# Patient Record
Sex: FEMALE | Race: OTHER | HISPANIC OR LATINO | Employment: FULL TIME | URBAN - METROPOLITAN AREA
[De-identification: names, ages, dates, MRNs, and addresses within clinical notes are randomized per-mention and may not be internally consistent; named-entity substitution may affect disease eponyms.]

---

## 2019-08-29 ENCOUNTER — OFFICE VISIT (OUTPATIENT)
Dept: OBGYN CLINIC | Facility: CLINIC | Age: 24
End: 2019-08-29

## 2019-08-29 VITALS — HEART RATE: 69 BPM | DIASTOLIC BLOOD PRESSURE: 77 MMHG | WEIGHT: 182.2 LBS | SYSTOLIC BLOOD PRESSURE: 124 MMHG

## 2019-08-29 DIAGNOSIS — N90.89 VULVAR LUMP: ICD-10-CM

## 2019-08-29 DIAGNOSIS — IMO0001 CONTRACEPTION: Primary | ICD-10-CM

## 2019-08-29 PROCEDURE — 99213 OFFICE O/P EST LOW 20 MIN: CPT | Performed by: OBSTETRICS & GYNECOLOGY

## 2019-08-29 RX ORDER — BACITRACIN, NEOMYCIN, POLYMYXIN B 400; 3.5; 5 [USP'U]/G; MG/G; [USP'U]/G
OINTMENT TOPICAL 2 TIMES DAILY
Qty: 15 G | Refills: 0 | Status: SHIPPED | OUTPATIENT
Start: 2019-08-29

## 2019-08-29 RX ORDER — MEDROXYPROGESTERONE ACETATE 150 MG/ML
150 INJECTION, SUSPENSION INTRAMUSCULAR
Qty: 1 ML | Refills: 4 | Status: SHIPPED | OUTPATIENT
Start: 2019-08-29

## 2019-08-29 NOTE — PROGRESS NOTES
ASSESMENT & PLAN:           1  Contraception   - Pt was previously on Depoprovera, refills provided  2  Right vulvar lesion   - Likely 2/2 razor burn   - Right vulvar lesion, not infected, no external manifestation, 1x1cm palpable mass, Rx Neosporin, recommended hot compress, wax pubic hair instead of cutting with razors      SUBJECTIVE:             Patient is a 27 y o  P0 with right vulvar lesion after shaving  She is also here for depoprovera  She is not currently sexually active but would like to resume depoprovera for birth control in general  She declined STD testing  Review of Systems   Review of Systems   Gastrointestinal: Negative for constipation, diarrhea, nausea and vomiting  Genitourinary: Negative for flank pain, menstrual problem, vaginal discharge and vaginal pain  Right vaginal "lump"   Musculoskeletal: Negative for back pain  OB Hx:  OB History   No data available     Medical Hx  No past medical history on file  Surgical Hx  No past surgical history on file  Family Hx  No family history on file    Social Hx  Social History     Socioeconomic History    Marital status: Single     Spouse name: Not on file    Number of children: Not on file    Years of education: Not on file    Highest education level: Not on file   Occupational History    Not on file   Social Needs    Financial resource strain: Not on file    Food insecurity:     Worry: Not on file     Inability: Not on file    Transportation needs:     Medical: Not on file     Non-medical: Not on file   Tobacco Use    Smoking status: Not on file   Substance and Sexual Activity    Alcohol use: Not on file    Drug use: Not on file    Sexual activity: Not on file   Lifestyle    Physical activity:     Days per week: Not on file     Minutes per session: Not on file    Stress: Not on file   Relationships    Social connections:     Talks on phone: Not on file     Gets together: Not on file     Attends Latter day service: Not on file     Active member of club or organization: Not on file     Attends meetings of clubs or organizations: Not on file     Relationship status: Not on file    Intimate partner violence:     Fear of current or ex partner: Not on file     Emotionally abused: Not on file     Physically abused: Not on file     Forced sexual activity: Not on file   Other Topics Concern    Not on file   Social History Narrative    Not on file     Allergies  No Known Allergies  Meds  No current outpatient medications on file  OBJECTIVE:               Vitals  Vitals:    08/29/19 1014   BP: 124/77   Pulse: 69       Physical Exam   Constitutional: She is oriented to person, place, and time  She appears well-developed and well-nourished  No distress  Genitourinary: Vagina normal    Genitourinary Comments: No visible skin lesion on right vulva, palpable 1x1cm round mass superficial right vulvar area at 10 o'clock noted   Neurological: She is alert and oriented to person, place, and time  Skin: She is not diaphoretic  Vitals reviewed        Chad Rodriguez DO  PGY-3 OB/GYN   8/29/2019 10:27 AM

## 2019-10-31 ENCOUNTER — HOSPITAL ENCOUNTER (EMERGENCY)
Facility: HOSPITAL | Age: 24
Discharge: HOME | End: 2019-10-31
Attending: EMERGENCY MEDICINE
Payer: COMMERCIAL

## 2019-10-31 VITALS
RESPIRATION RATE: 20 BRPM | DIASTOLIC BLOOD PRESSURE: 96 MMHG | HEART RATE: 72 BPM | WEIGHT: 184.7 LBS | TEMPERATURE: 96.7 F | SYSTOLIC BLOOD PRESSURE: 113 MMHG | OXYGEN SATURATION: 100 %

## 2019-10-31 DIAGNOSIS — N39.0 UTI (URINARY TRACT INFECTION): ICD-10-CM

## 2019-10-31 DIAGNOSIS — R10.2 PELVIC PAIN: Primary | ICD-10-CM

## 2019-10-31 LAB
BACTERIA UR QL AUTO: ABNORMAL /HPF
BILIRUB UR QL STRIP: NEGATIVE
CLARITY UR: ABNORMAL
COLOR UR: YELLOW
EXT PREG TEST URINE: NORMAL
EXT. CONTROL ED NAV: NEGATIVE
GLUCOSE UR STRIP-MCNC: NEGATIVE MG/DL
HGB UR QL STRIP.AUTO: NEGATIVE
KETONES UR STRIP-MCNC: ABNORMAL MG/DL
LEUKOCYTE ESTERASE UR QL STRIP: NEGATIVE
MUCOUS THREADS UR QL AUTO: ABNORMAL
NITRITE UR QL STRIP: NEGATIVE
NON-SQ EPI CELLS URNS QL MICRO: ABNORMAL /HPF
PH UR STRIP.AUTO: 6.5 [PH]
PROT UR STRIP-MCNC: ABNORMAL MG/DL
RBC #/AREA URNS AUTO: ABNORMAL /HPF
SP GR UR STRIP.AUTO: 1.02 (ref 1–1.04)
UROBILINOGEN UA: 1 MG/DL
WBC #/AREA URNS AUTO: ABNORMAL /HPF

## 2019-10-31 PROCEDURE — 99284 EMERGENCY DEPT VISIT MOD MDM: CPT | Performed by: EMERGENCY MEDICINE

## 2019-10-31 PROCEDURE — 81025 URINE PREGNANCY TEST: CPT | Performed by: EMERGENCY MEDICINE

## 2019-10-31 PROCEDURE — 99283 EMERGENCY DEPT VISIT LOW MDM: CPT

## 2019-10-31 PROCEDURE — 81001 URINALYSIS AUTO W/SCOPE: CPT | Performed by: EMERGENCY MEDICINE

## 2019-10-31 RX ORDER — KETOROLAC TROMETHAMINE 30 MG/ML
15 INJECTION, SOLUTION INTRAMUSCULAR; INTRAVENOUS ONCE
Status: COMPLETED | OUTPATIENT
Start: 2019-10-31 | End: 2019-10-31

## 2019-10-31 RX ORDER — NAPROXEN 500 MG/1
500 TABLET ORAL 2 TIMES DAILY WITH MEALS
Qty: 20 TABLET | Refills: 0 | Status: SHIPPED | OUTPATIENT
Start: 2019-10-31

## 2019-10-31 RX ORDER — SULFAMETHOXAZOLE AND TRIMETHOPRIM 800; 160 MG/1; MG/1
1 TABLET ORAL 2 TIMES DAILY
Qty: 5 TABLET | Refills: 0 | Status: SHIPPED | OUTPATIENT
Start: 2019-10-31 | End: 2019-11-03

## 2019-10-31 RX ORDER — SULFAMETHOXAZOLE AND TRIMETHOPRIM 800; 160 MG/1; MG/1
1 TABLET ORAL ONCE
Status: COMPLETED | OUTPATIENT
Start: 2019-10-31 | End: 2019-10-31

## 2019-10-31 RX ADMIN — KETOROLAC TROMETHAMINE 15 MG: 30 INJECTION, SOLUTION INTRAMUSCULAR; INTRAVENOUS at 02:03

## 2019-10-31 RX ADMIN — SULFAMETHOXAZOLE AND TRIMETHOPRIM 1 TABLET: 800; 160 TABLET ORAL at 02:03

## 2019-10-31 NOTE — ED PROVIDER NOTES
History  Chief Complaint   Patient presents with    Abdominal Pain     pt reports lower abdominal pain x1 hour  denies n/v  states she had pancreatitis 15 years ago, no surgery  26 y/o female c/o crampy lower abdominal / pelvic pain which began one hour PTA  Denies vaginal bleeding, discharge, or dysuria  No fever/chills  Surgical history noncontributory  No meds taken PTA  Pain is constant and nonradiating  Denies nausea and vomiting  Prior to Admission Medications   Prescriptions Last Dose Informant Patient Reported? Taking? medroxyPROGESTERone (DEPO-PROVERA) 150 mg/mL injection   No No   Sig: Inject 1 mL (150 mg total) into a muscle every 3 (three) months   neomycin-bacitracin-polymyxin b (NEOSPORIN) ointment   No No   Sig: Apply topically 2 (two) times a day      Facility-Administered Medications: None       Past Medical History:   Diagnosis Date    Pancreatitis        No past surgical history on file  No family history on file  I have reviewed and agree with the history as documented  Social History     Tobacco Use    Smoking status: Not on file   Substance Use Topics    Alcohol use: Not on file    Drug use: Not on file        Review of Systems   Gastrointestinal: Positive for abdominal pain  Genitourinary: Positive for pelvic pain  All other systems reviewed and are negative  Physical Exam  Physical Exam   Constitutional: She appears well-developed and well-nourished  No distress  HENT:   Head: Normocephalic and atraumatic  Eyes: Pupils are equal, round, and reactive to light  Cardiovascular: Normal rate  Pulmonary/Chest: Effort normal and breath sounds normal    Abdominal: Soft  Normal appearance and bowel sounds are normal  There is no rigidity, no rebound and no guarding  Neurological: She is alert  Skin: Skin is warm  Capillary refill takes less than 2 seconds  Psychiatric: She has a normal mood and affect  Vitals reviewed        Vital Signs  ED Triage Vitals   Temperature Pulse Respirations Blood Pressure SpO2   10/31/19 0022 10/31/19 0022 10/31/19 0022 10/31/19 0023 10/31/19 0022   (!) 96 7 °F (35 9 °C) 72 20 113/96 100 %      Temp Source Heart Rate Source Patient Position - Orthostatic VS BP Location FiO2 (%)   10/31/19 0022 10/31/19 0022 10/31/19 0022 10/31/19 0022 --   Tympanic Monitor Sitting Right arm       Pain Score       --                  Vitals:    10/31/19 0022 10/31/19 0023   BP:  113/96   Pulse: 72    Patient Position - Orthostatic VS: Sitting          Visual Acuity      ED Medications  Medications   ketorolac (TORADOL) injection 15 mg (has no administration in time range)   sulfamethoxazole-trimethoprim (BACTRIM DS) 800-160 mg per tablet 1 tablet (has no administration in time range)       Diagnostic Studies  Results Reviewed     Procedure Component Value Units Date/Time    Urine Microscopic [[id number]]  (Abnormal) Collected:  10/31/19 0121    Lab Status:  Final result Specimen:  Urine, Clean Catch Updated:  10/31/19 0142     RBC, UA None Seen /hpf      WBC, UA 1-2 /hpf      Epithelial Cells Occasional /hpf      Bacteria, UA Occasional /hpf      MUCUS THREADS Moderate    UA w Reflex to Microscopic [[id number]]  (Abnormal) Collected:  10/31/19 0121    Lab Status:  Final result Specimen:  Urine, Clean Catch Updated:  10/31/19 0130     Color, UA Yellow     Clarity, UA Slightly Cloudy     Specific [place], UA 1 020     pH, UA 6 5     Leukocytes, UA Negative     Nitrite, UA Negative     Protein, UA 15 (Trace) mg/dl      Glucose, UA Negative mg/dl      Ketones, UA >=150 (3+) mg/dl      Bilirubin, UA Negative     Blood, UA Negative     UROBILINOGEN UA 1 0 mg/dL     POCT pregnancy, urine [[id number]]  (Normal) Resulted:  10/31/19 0122    Lab Status:  Final result Updated:  10/31/19 0123     EXT PREG TEST UR (Ref: Negative) Neg       Control Negative                 No orders to display              Procedures  Procedures       ED Course MDM    Disposition  Final diagnoses:   Pelvic pain   UTI (urinary tract infection)     Time reflects when diagnosis was documented in both MDM as applicable and the Disposition within this note     Time User Action Codes Description Comment    10/31/2019  1:47 AM Gus Zain Add [R10 2] Pelvic pain     10/31/2019  1:47 AM Gus Pittman Add [N39 0] UTI (urinary tract infection)       ED Disposition     ED Disposition Condition Date/Time Comment    Discharge Stable Thu Oct 31, 2019  1:47 AM Adriana Meza discharge to home/self care  Follow-up Information     Follow up With Specialties Details Why Contact Info Additional Information    [address] [facility] Medicine Go to  As needed [address], [address] [facility] [address]  [facility] [address] [facility], [address], [address], [facility]          Patient's Medications   Discharge Prescriptions    NAPROXEN (NAPROSYN) 500 MG TABLET    Take 1 tablet (500 mg total) by mouth 2 (two) times a day with meals       Start Date: 10/31/2019End Date: --       Order Dose: 500 mg       Quantity: 20 tablet    Refills: 0    SULFAMETHOXAZOLE-TRIMETHOPRIM (BACTRIM DS) 800-160 MG PER TABLET    Take 1 tablet by mouth 2 (two) times a day for 3 days smx-tmp DS (BACTRIM) 800-160 mg tabs (1tab q12 D10)       Start Date: 10/31/2019End Date: 11/3/2019       Order Dose: 1 tablet       Quantity: 5 tablet    Refills: 0     No discharge procedures on file      ED Provider  Electronically Signed by           Jimena Hernadez DO  10/31/19 6530

## 2019-12-26 ENCOUNTER — APPOINTMENT (EMERGENCY)
Dept: CT IMAGING | Facility: HOSPITAL | Age: 24
End: 2019-12-26
Payer: COMMERCIAL

## 2019-12-26 ENCOUNTER — HOSPITAL ENCOUNTER (EMERGENCY)
Facility: HOSPITAL | Age: 24
Discharge: HOME | End: 2019-12-26
Attending: EMERGENCY MEDICINE
Payer: COMMERCIAL

## 2019-12-26 VITALS
WEIGHT: 189.9 LBS | TEMPERATURE: 97.1 F | RESPIRATION RATE: 16 BRPM | DIASTOLIC BLOOD PRESSURE: 63 MMHG | SYSTOLIC BLOOD PRESSURE: 122 MMHG | OXYGEN SATURATION: 100 % | HEART RATE: 71 BPM

## 2019-12-26 DIAGNOSIS — R10.31 RIGHT LOWER QUADRANT ABDOMINAL PAIN: Primary | ICD-10-CM

## 2019-12-26 LAB
ANION GAP SERPL CALCULATED.3IONS-SCNC: 11 MMOL/L (ref 5–14)
BILIRUB UR QL STRIP: NEGATIVE
BUN SERPL-MCNC: 12 MG/DL (ref 5–25)
CALCIUM SERPL-MCNC: 9.2 MG/DL (ref 8.4–10.2)
CHLORIDE SERPL-SCNC: 103 MMOL/L (ref 97–108)
CLARITY UR: CLEAR
CO2 SERPL-SCNC: 24 MMOL/L (ref 22–30)
COLOR UR: NORMAL
CREAT SERPL-MCNC: 0.73 MG/DL (ref 0.6–1.2)
ERYTHROCYTE [DISTWIDTH] IN BLOOD BY AUTOMATED COUNT: 12.7 %
EXT PREG TEST URINE: NORMAL
EXT. CONTROL ED NAV: NEGATIVE
GFR SERPL CREATININE-BSD FRML MDRD: 117 ML/MIN/1.73SQ M
GLUCOSE SERPL-MCNC: 81 MG/DL (ref 70–99)
GLUCOSE UR STRIP-MCNC: NEGATIVE MG/DL
HCT VFR BLD AUTO: 37.8 % (ref 36–46)
HGB BLD-MCNC: 12.4 G/DL (ref 12–16)
HGB UR QL STRIP.AUTO: NEGATIVE
KETONES UR STRIP-MCNC: NEGATIVE MG/DL
LEUKOCYTE ESTERASE UR QL STRIP: NEGATIVE
LYMPHOCYTES # BLD AUTO: 1.8 THOUSAND/UL (ref 0.5–4)
LYMPHOCYTES # BLD AUTO: 11 % (ref 25–45)
MCH RBC QN AUTO: 31 PG (ref 26–34)
MCHC RBC AUTO-ENTMCNC: 32.8 G/DL (ref 31–36)
MCV RBC AUTO: 94 FL (ref 80–100)
MONOCYTES # BLD AUTO: 0.49 THOUSAND/UL (ref 0.2–0.9)
MONOCYTES NFR BLD AUTO: 3 % (ref 1–10)
NEUTS SEG # BLD: 14.1 THOUSAND/UL (ref 1.8–7.8)
NEUTS SEG NFR BLD AUTO: 86 %
NITRITE UR QL STRIP: NEGATIVE
PH UR STRIP.AUTO: 7 [PH]
PLATELET # BLD AUTO: 262 THOUSANDS/UL (ref 150–450)
PLATELET BLD QL SMEAR: ADEQUATE
PMV BLD AUTO: 8 FL (ref 8.9–12.7)
POTASSIUM SERPL-SCNC: 4.1 MMOL/L (ref 3.6–5)
PROT UR STRIP-MCNC: NEGATIVE MG/DL
RBC # BLD AUTO: 4.01 MILLION/UL (ref 4–5.2)
RBC MORPH BLD: NORMAL
SODIUM SERPL-SCNC: 138 MMOL/L (ref 137–147)
SP GR UR STRIP.AUTO: 1.01 (ref 1–1.04)
TOTAL CELLS COUNTED SPEC: 100
UROBILINOGEN UA: NEGATIVE MG/DL
WBC # BLD AUTO: 16.4 THOUSAND/UL (ref 4.5–11)

## 2019-12-26 PROCEDURE — 74177 CT ABD & PELVIS W/CONTRAST: CPT

## 2019-12-26 PROCEDURE — 36415 COLL VENOUS BLD VENIPUNCTURE: CPT | Performed by: PHYSICIAN ASSISTANT

## 2019-12-26 PROCEDURE — 99284 EMERGENCY DEPT VISIT MOD MDM: CPT

## 2019-12-26 PROCEDURE — 81025 URINE PREGNANCY TEST: CPT | Performed by: PHYSICIAN ASSISTANT

## 2019-12-26 PROCEDURE — 96361 HYDRATE IV INFUSION ADD-ON: CPT

## 2019-12-26 PROCEDURE — 85007 BL SMEAR W/DIFF WBC COUNT: CPT | Performed by: PHYSICIAN ASSISTANT

## 2019-12-26 PROCEDURE — 99284 EMERGENCY DEPT VISIT MOD MDM: CPT | Performed by: PHYSICIAN ASSISTANT

## 2019-12-26 PROCEDURE — 80048 BASIC METABOLIC PNL TOTAL CA: CPT | Performed by: PHYSICIAN ASSISTANT

## 2019-12-26 PROCEDURE — 85027 COMPLETE CBC AUTOMATED: CPT | Performed by: PHYSICIAN ASSISTANT

## 2019-12-26 PROCEDURE — 96375 TX/PRO/DX INJ NEW DRUG ADDON: CPT

## 2019-12-26 PROCEDURE — 81003 URINALYSIS AUTO W/O SCOPE: CPT | Performed by: EMERGENCY MEDICINE

## 2019-12-26 PROCEDURE — 96374 THER/PROPH/DIAG INJ IV PUSH: CPT

## 2019-12-26 RX ORDER — ONDANSETRON 2 MG/ML
4 INJECTION INTRAMUSCULAR; INTRAVENOUS ONCE
Status: COMPLETED | OUTPATIENT
Start: 2019-12-26 | End: 2019-12-26

## 2019-12-26 RX ORDER — KETOROLAC TROMETHAMINE 30 MG/ML
15 INJECTION, SOLUTION INTRAMUSCULAR; INTRAVENOUS ONCE
Status: COMPLETED | OUTPATIENT
Start: 2019-12-26 | End: 2019-12-26

## 2019-12-26 RX ADMIN — ONDANSETRON 4 MG: 2 INJECTION, SOLUTION INTRAMUSCULAR; INTRAVENOUS at 21:46

## 2019-12-26 RX ADMIN — IOHEXOL 100 ML: 350 INJECTION, SOLUTION INTRAVENOUS at 22:18

## 2019-12-26 RX ADMIN — SODIUM CHLORIDE 1000 ML: 0.9 INJECTION, SOLUTION INTRAVENOUS at 21:49

## 2019-12-26 RX ADMIN — KETOROLAC TROMETHAMINE 15 MG: 30 INJECTION, SOLUTION INTRAMUSCULAR at 21:45

## 2019-12-27 NOTE — ED PROVIDER NOTES
History  Chief Complaint   Patient presents with    Abdominal Pain     suprapubic pain with burning and pain with urination    Vomiting     x 3    22-year-old female presenting for evaluation of right lower quadrant and suprapubic pain  Patient reports pain starting approximately 2 hours prior to arrival   She denies any dysuria hematuria vaginal discharge or vaginal bleeding  She reports vomiting twice prior to arrival   She reports continued nausea but no diarrhea or constipation  No fevers chills or sweats  This feels unlike anything she has ever felt before  Prior to Admission Medications   Prescriptions Last Dose Informant Patient Reported? Taking? medroxyPROGESTERone (DEPO-PROVERA) 150 mg/mL injection   No No   Sig: Inject 1 mL (150 mg total) into a muscle every 3 (three) months   naproxen (NAPROSYN) 500 mg tablet   No No   Sig: Take 1 tablet (500 mg total) by mouth 2 (two) times a day with meals   neomycin-bacitracin-polymyxin b (NEOSPORIN) ointment   No No   Sig: Apply topically 2 (two) times a day      Facility-Administered Medications: None       Past Medical History:   Diagnosis Date    Pancreatitis        History reviewed  No pertinent surgical history  History reviewed  No pertinent family history  I have reviewed and agree with the history as documented  Social History     Tobacco Use    Smoking status: Never Smoker    Smokeless tobacco: Never Used   Substance Use Topics    Alcohol use: Never     Frequency: Never    Drug use: Never        Review of Systems   All other systems reviewed and are negative  Physical Exam  Physical Exam   Constitutional: She is oriented to person, place, and time  She appears well-developed and well-nourished  Non-toxic appearance  She does not appear ill  No distress  HENT:   Head: Normocephalic and atraumatic  Eyes: Conjunctivae are normal    EOM grossly intact   Neck: Normal range of motion  Neck supple  No JVD present  Cardiovascular: Normal rate  Pulmonary/Chest: Effort normal    Abdominal: Soft  There is tenderness in the right lower quadrant  Musculoskeletal:   FROM, steady gait, cap refill brisk, strength and sensation grossly intact throughout   Neurological: She is alert and oriented to person, place, and time  Skin: Skin is warm and dry  Capillary refill takes less than 2 seconds  Psychiatric: She has a normal mood and affect  Her behavior is normal    Nursing note and vitals reviewed        Vital Signs  ED Triage Vitals [12/26/19 2028]   Temperature Pulse Respirations Blood Pressure SpO2   (!) 97 1 °F (36 2 °C) 71 16 122/63 100 %      Temp Source Heart Rate Source Patient Position - Orthostatic VS BP Location FiO2 (%)   Tympanic Monitor Sitting Left arm --      Pain Score       7           Vitals:    12/26/19 2028   BP: 122/63   Pulse: 71   Patient Position - Orthostatic VS: Sitting         Visual Acuity      ED Medications  Medications   sodium chloride 0 9 % bolus 1,000 mL (0 mL Intravenous Stopped 12/26/19 2348)   ondansetron (ZOFRAN) injection 4 mg (4 mg Intravenous Given 12/26/19 2146)   ketorolac (TORADOL) injection 15 mg (15 mg Intravenous Given 12/26/19 2145)   iohexol (OMNIPAQUE) 350 MG/ML injection (SINGLE-DOSE) 100 mL (100 mL Intravenous Given 12/26/19 2218)       Diagnostic Studies  Results Reviewed     Procedure Component Value Units Date/Time    Basic metabolic panel [[id number]]  (Normal) Collected:  12/26/19 2149    Lab Status:  Final result Specimen:  Blood from Arm, Left Updated:  12/26/19 2204     Sodium 138 mmol/L      Potassium 4 1 mmol/L      Chloride 103 mmol/L      CO2 24 mmol/L      ANION GAP 11 mmol/L      BUN 12 mg/dL      Creatinine 0 73 mg/dL      Glucose 81 mg/dL      Calcium 9 2 mg/dL      eGFR 117 ml/min/1 73sq m     Narrative:       [place] guidelines for Chronic Kidney Disease (CKD):     Stage 1 with normal or high GFR (GFR > 90 mL/min/1 73 square meters)    Stage 2 Mild CKD (GFR = 60-89 mL/min/1 73 square meters)    Stage 3A Moderate CKD (GFR = 45-59 mL/min/1 73 square meters)    Stage 3B Moderate CKD (GFR = 30-44 mL/min/1 73 square meters)    Stage 4 Severe CKD (GFR = 15-29 mL/min/1 73 square meters)    Stage 5 End Stage CKD (GFR <15 mL/min/1 73 square meters)  Note: GFR calculation is accurate only with a steady state creatinine    CBC and differential [[id number]]  (Abnormal) Collected:  12/26/19 2149    Lab Status:  Final result Specimen:  Blood from Arm, Left Updated:  12/26/19 2159     WBC 16 40 Thousand/uL      RBC 4 01 Million/uL      Hemoglobin 12 4 g/dL      Hematocrit 37 8 %      MCV 94 fL      MCH 31 0 pg      MCHC 32 8 g/dL      RDW 12 7 %      MPV 8 0 fL      Platelets 414 Thousands/uL     POCT pregnancy, urine [[id number]]  (Normal) Resulted:  12/26/19 2121    Lab Status:  Final result Updated:  12/26/19 2121     EXT PREG TEST UR (Ref: Negative) valid     Control Negative    UA w Reflex to Microscopic w Reflex to Culture [[id number]]  (Normal) Collected:  12/26/19 2037    Lab Status:  Final result Specimen:  Urine, Clean Catch Updated:  12/26/19 2053     Color, UA Straw     Clarity, UA Clear     Specific Gravity, UA 1 010     pH, UA 7 0     Leukocytes, UA Negative     Nitrite, UA Negative     Protein, UA Negative mg/dl      Glucose, UA Negative mg/dl      Ketones, UA Negative mg/dl      Bilirubin, UA Negative     Blood, UA Negative     UROBILINOGEN UA Negative mg/dL                  CT abdomen pelvis with contrast   Final Result by Noel Davis DO (12/26 2342)      Appendix not visualized, however, no focal pericecal inflammatory changes seen  No other evidence of acute intra-abdominal abnormality           Workstation performed: [id number]                    Procedures  Procedures         ED Course                               MDM  Number of Diagnoses or Management Options  Right lower quadrant abdominal pain:   Diagnosis management comments: 28 yo F presenting for evaluation of RLQ tenderness to palpation, appendix was not visualized on ct, she appears well, repeat abdominal examination is benign, advised to return in 24 hours if pain worsens, she is afebrile, non toxic, no acute distress, f/u with pcp as an outpatient    All labs and imaging discussed with patient, strict return to ED precautions discussed  Pt verbalizes understanding and agrees with plan  Pt is stable for discharge    Portions of the record may have been created with voice recognition software  Occasional wrong word or "sound a like" substitutions may have occurred due to the inherent limitations of voice recognition software  Read the chart carefully and recognize, using context, where substitutions have occurred  Disposition  Final diagnoses:   Right lower quadrant abdominal pain     Time reflects when diagnosis was documented in both MDM as applicable and the Disposition within this note     Time User Action Codes Description Comment    12/26/2019 11:45 PM Adelso Myles Add [R10 31] Right lower quadrant abdominal pain       ED Disposition     ED Disposition Condition Date/Time Comment    Discharge Stable Thu Dec 26, 2019 11:45 PM Alejandra Rogers 84 discharge to home/self care              Follow-up Information     Follow up With Specialties Details Why Contact Info Additional [address] Family Medicine Schedule an appointment as soon as possible for a visit   [address], [address] [facility] [address]  [address], [address], [address] [place], [place], [address] [place] [address]          Discharge Medication List as of 12/26/2019 11:46 PM      CONTINUE these medications which have NOT CHANGED    Details   medroxyPROGESTERone (DEPO-PROVERA) 150 mg/mL injection Inject 1 mL (150 mg total) into a muscle every 3 (three) months, Starting Thu 8/29/2019, Normal      naproxen (NAPROSYN) 500 mg tablet Take 1 tablet (500 mg total) by mouth 2 (two) times a day with meals, Starting Thu 10/31/2019, Print      neomycin-bacitracin-polymyxin b (NEOSPORIN) ointment Apply topically 2 (two) times a day, Starting Thu 8/29/2019, Normal           No discharge procedures on file      ED Provider  Electronically Signed by           Estephania Wyman PA-C  12/30/19 9563

## 2019-12-27 NOTE — ED NOTES
Pt and 2 friends in room giggling and answering questions poorly  When asked the pt what hurts her/what symptoms is she having pt stated "Good question" and friends in room laughing       Emma Postin  12/26/19 2045

## 2019-12-31 NOTE — ED ATTENDING ATTESTATION
I was the attending physician on duty at the time the patient visited the emergency department  The patient was evaluated and dispositioned by the APC  I was personally available for consultation  I am administratively signing the chart after the fact      April Chang MD

## 2023-07-11 ENCOUNTER — ULTRASOUND (OUTPATIENT)
Dept: OBGYN CLINIC | Facility: CLINIC | Age: 28
End: 2023-07-11

## 2023-07-11 VITALS
SYSTOLIC BLOOD PRESSURE: 114 MMHG | HEIGHT: 68 IN | DIASTOLIC BLOOD PRESSURE: 70 MMHG | BODY MASS INDEX: 30.83 KG/M2 | HEART RATE: 90 BPM | WEIGHT: 203.4 LBS

## 2023-07-11 DIAGNOSIS — N92.6 MISSED MENSES: Primary | ICD-10-CM

## 2023-07-11 DIAGNOSIS — Z32.01 POSITIVE PREGNANCY TEST: ICD-10-CM

## 2023-07-11 PROBLEM — IMO0001 CONTRACEPTION: Status: RESOLVED | Noted: 2019-08-29 | Resolved: 2023-07-11

## 2023-07-11 PROBLEM — N90.89 VULVAR LUMP: Status: RESOLVED | Noted: 2019-08-29 | Resolved: 2023-07-11

## 2023-07-11 LAB — SL AMB POCT URINE HCG: POSITIVE

## 2023-07-11 PROCEDURE — 81025 URINE PREGNANCY TEST: CPT | Performed by: NURSE PRACTITIONER

## 2023-07-11 PROCEDURE — 76817 TRANSVAGINAL US OBSTETRIC: CPT | Performed by: NURSE PRACTITIONER

## 2023-07-11 PROCEDURE — 99203 OFFICE O/P NEW LOW 30 MIN: CPT | Performed by: NURSE PRACTITIONER

## 2023-07-11 RX ORDER — VITAMIN A ACETATE, .BETA.-CAROTENE, ASCORBIC ACID, CHOLECALCIFEROL, .ALPHA.-TOCOPHEROL ACETATE, DL-, THIAMINE MONONITRATE, RIBOFLAVIN, NIACINAMIDE, PYRIDOXINE HYDROCHLORIDE, FOLIC ACID, CYANOCOBALAMIN, CALCIUM CARBONATE, FERROUS FUMARATE, ZINC OXIDE, CUPRIC OXIDE 9.9; 120; 920; 200; 400; 2; 12; 27; 1; 20; 10; 3; 1.84; 3080; 25 MG/1; MG/1; [IU]/1; MG/1; [IU]/1; MG/1; UG/1; MG/1; MG/1; MG/1; MG/1; MG/1; MG/1; [IU]/1; MG/1
1 TABLET, FILM COATED ORAL DAILY
Qty: 90 TABLET | Refills: 4 | Status: SHIPPED | OUTPATIENT
Start: 2023-07-11

## 2023-07-11 NOTE — PROGRESS NOTES
Rosie Carter is a  who presents today for viability/dating ultrasound. Patient's last menstrual period was 2023 (exact date). She reports positive pregnancy test at home on 2023. She denies vaginal bleeding or abdominal/pelvic pain. /70   Pulse 90   Ht 5' 8" (1.727 m)   Wt 92.3 kg (203 lb 6.4 oz)   LMP 2023 (Exact Date)   BMI 30.93 kg/m²   Abdomen soft and non-tender. Transvaginal Pelvic Ultrasound:  # of fetuses: 1  Intrauterine: yes  Average CRL: 1.12cm (7w2d)  EDC based on CRL: 2024  Fetal cardiac activity: present  FHR: 139  Additional Findings: + yolk sac    Please choose how you are assigning the CATALINO: The gestational age by LMP is </= 8w 6d and demonstrates 5 or fewer days difference from the gestational age by [facility], therefore the final CATALINO will be based on the LMP. Final CATALINO: 2024 by LMP. Return in 2 weeks for OB intake. Rx prenatal vitamins sent to pharmacy.     Current Outpatient Medications   Medication Instructions   • Prenatal Vit-Fe Fumarate-FA (Prenatal Plus Vitamin/Mineral) 27-1 MG TABS 1 tablet, Oral, Daily       Javad Puga, [address] [place] [address]  [address] [place] [phone]  Dept: [phone]  Dept Fax: [phone]: [phone]  [address] [place] [address]: [phone]  [address] [place] [address] Fax: [phone]

## 2023-07-11 NOTE — PATIENT INSTRUCTIONS
Bernadette por bartholomew confianza en nuestro equipo. Vinay Red y agradecemos debi comentarios. Si recibe rocky encuesta nuestra, tómese unos momentos para informarnos cómo estamos.    Sinceramente,  Porter Jimenez

## 2023-07-12 ENCOUNTER — HOSPITAL ENCOUNTER (EMERGENCY)
Facility: HOSPITAL | Age: 28
Discharge: HOME | End: 2023-07-12
Attending: EMERGENCY MEDICINE
Payer: COMMERCIAL

## 2023-07-12 VITALS
BODY MASS INDEX: 30.77 KG/M2 | OXYGEN SATURATION: 100 % | DIASTOLIC BLOOD PRESSURE: 62 MMHG | TEMPERATURE: 98.5 F | HEART RATE: 67 BPM | SYSTOLIC BLOOD PRESSURE: 132 MMHG | RESPIRATION RATE: 16 BRPM | WEIGHT: 202.34 LBS

## 2023-07-12 DIAGNOSIS — O20.0 THREATENED MISCARRIAGE IN EARLY PREGNANCY: Primary | ICD-10-CM

## 2023-07-12 DIAGNOSIS — O99.891 ASYMPTOMATIC BACTERIURIA DURING PREGNANCY: ICD-10-CM

## 2023-07-12 DIAGNOSIS — R82.71 ASYMPTOMATIC BACTERIURIA DURING PREGNANCY: ICD-10-CM

## 2023-07-12 LAB
ABO GROUP BLD: NORMAL
ALBUMIN SERPL BCP-MCNC: 4.4 G/DL (ref 3.5–5)
ALP SERPL-CCNC: 41 U/L (ref 34–104)
ALT SERPL W P-5'-P-CCNC: 12 U/L (ref 7–52)
ANION GAP SERPL CALCULATED.3IONS-SCNC: 8 MMOL/L
AST SERPL W P-5'-P-CCNC: 20 U/L (ref 13–39)
B-HCG SERPL-ACNC: ABNORMAL MIU/ML (ref 0–5)
BACTERIA UR QL AUTO: ABNORMAL /HPF
BASOPHILS # BLD AUTO: 0.02 THOUSANDS/ÂΜL (ref 0–0.1)
BASOPHILS NFR BLD AUTO: 0 % (ref 0–1)
BILIRUB SERPL-MCNC: 0.63 MG/DL (ref 0.2–1)
BILIRUB UR QL STRIP: NEGATIVE
BUN SERPL-MCNC: 9 MG/DL (ref 5–25)
CALCIUM SERPL-MCNC: 9.6 MG/DL (ref 8.4–10.2)
CHLORIDE SERPL-SCNC: 104 MMOL/L (ref 96–108)
CLARITY UR: CLEAR
CO2 SERPL-SCNC: 26 MMOL/L (ref 21–32)
COLOR UR: YELLOW
CREAT SERPL-MCNC: 0.63 MG/DL (ref 0.6–1.3)
EOSINOPHIL # BLD AUTO: 0.05 THOUSAND/ÂΜL (ref 0–0.61)
EOSINOPHIL NFR BLD AUTO: 1 % (ref 0–6)
ERYTHROCYTE [DISTWIDTH] IN BLOOD BY AUTOMATED COUNT: 12 % (ref 11.6–15.1)
GFR SERPL CREATININE-BSD FRML MDRD: 124 ML/MIN/1.73SQ M
GLUCOSE SERPL-MCNC: 80 MG/DL (ref 65–140)
GLUCOSE UR STRIP-MCNC: NEGATIVE MG/DL
HCT VFR BLD AUTO: 40 % (ref 34.8–46.1)
HGB BLD-MCNC: 12.7 G/DL (ref 11.5–15.4)
HGB UR QL STRIP.AUTO: 250
IMM GRANULOCYTES # BLD AUTO: 0.04 THOUSAND/UL (ref 0–0.2)
IMM GRANULOCYTES NFR BLD AUTO: 0 % (ref 0–2)
KETONES UR STRIP-MCNC: NEGATIVE MG/DL
LEUKOCYTE ESTERASE UR QL STRIP: 25
LYMPHOCYTES # BLD AUTO: 1.81 THOUSANDS/ÂΜL (ref 0.6–4.47)
LYMPHOCYTES NFR BLD AUTO: 17 % (ref 14–44)
MCH RBC QN AUTO: 30.6 PG (ref 26.8–34.3)
MCHC RBC AUTO-ENTMCNC: 31.8 G/DL (ref 31.4–37.4)
MCV RBC AUTO: 96 FL (ref 82–98)
MONOCYTES # BLD AUTO: 0.74 THOUSAND/ÂΜL (ref 0.17–1.22)
MONOCYTES NFR BLD AUTO: 7 % (ref 4–12)
MUCOUS THREADS UR QL AUTO: ABNORMAL
NEUTROPHILS # BLD AUTO: 8.19 THOUSANDS/ÂΜL (ref 1.85–7.62)
NEUTS SEG NFR BLD AUTO: 75 % (ref 43–75)
NITRITE UR QL STRIP: NEGATIVE
NON-SQ EPI CELLS URNS QL MICRO: ABNORMAL /HPF
NRBC BLD AUTO-RTO: 0 /100 WBCS
PH UR STRIP.AUTO: 7 [PH]
PLATELET # BLD AUTO: 243 THOUSANDS/UL (ref 149–390)
PMV BLD AUTO: 9.6 FL (ref 8.9–12.7)
POTASSIUM SERPL-SCNC: 3.5 MMOL/L (ref 3.5–5.3)
PROT SERPL-MCNC: 7.6 G/DL (ref 6.4–8.4)
PROT UR STRIP-MCNC: NEGATIVE MG/DL
RBC # BLD AUTO: 4.15 MILLION/UL (ref 3.81–5.12)
RBC #/AREA URNS AUTO: ABNORMAL /HPF
RH BLD: POSITIVE
SODIUM SERPL-SCNC: 138 MMOL/L (ref 135–147)
SP GR UR STRIP.AUTO: 1.01 (ref 1–1.04)
UROBILINOGEN UA: NEGATIVE MG/DL
WBC # BLD AUTO: 10.85 THOUSAND/UL (ref 4.31–10.16)
WBC #/AREA URNS AUTO: ABNORMAL /HPF

## 2023-07-12 PROCEDURE — 81001 URINALYSIS AUTO W/SCOPE: CPT

## 2023-07-12 PROCEDURE — 87086 URINE CULTURE/COLONY COUNT: CPT

## 2023-07-12 PROCEDURE — 80053 COMPREHEN METABOLIC PANEL: CPT

## 2023-07-12 PROCEDURE — 85025 COMPLETE CBC W/AUTO DIFF WBC: CPT

## 2023-07-12 PROCEDURE — 86900 BLOOD TYPING SEROLOGIC ABO: CPT

## 2023-07-12 PROCEDURE — 81003 URINALYSIS AUTO W/O SCOPE: CPT

## 2023-07-12 PROCEDURE — 99283 EMERGENCY DEPT VISIT LOW MDM: CPT

## 2023-07-12 PROCEDURE — 86901 BLOOD TYPING SEROLOGIC RH(D): CPT

## 2023-07-12 PROCEDURE — 84702 CHORIONIC GONADOTROPIN TEST: CPT

## 2023-07-12 PROCEDURE — 99284 EMERGENCY DEPT VISIT MOD MDM: CPT

## 2023-07-12 PROCEDURE — 36415 COLL VENOUS BLD VENIPUNCTURE: CPT

## 2023-07-12 RX ORDER — CEPHALEXIN 500 MG/1
500 CAPSULE ORAL EVERY 8 HOURS SCHEDULED
Qty: 15 CAPSULE | Refills: 0 | Status: SHIPPED | OUTPATIENT
Start: 2023-07-12 | End: 2023-07-17

## 2023-07-12 NOTE — ED PROVIDER NOTES
History  Chief Complaint   Patient presents with   • Vaginal Bleeding - Pregnant     20 min pta - 6 weeks pregnant, saw obgyn yesterday     34 y.o.  female currently 6 weeks pregnant presents to ED c/o vaginal bleeding x 20 minutes. Per chart review, had initial ultrasound yesterday with confirmed IUP and fetal cardiac activity. History provided by:  Patient and significant other   used: Yes    Vaginal Bleeding  Quality:  Bright red and lighter than menses  Menstrual history:  Missed period  Possible pregnancy: yes    Context: at rest    Associated symptoms: no abdominal pain, no back pain, no dizziness, no dyspareunia, no dysuria, no fatigue, no fever, no nausea and no vaginal discharge        Prior to Admission Medications   Prescriptions Last Dose Informant Patient Reported? Taking? Prenatal Vit-Fe Fumarate-FA (Prenatal Plus Vitamin/Mineral) 27-1 MG TABS   No No   Sig: Take 1 tablet by mouth in the morning      Facility-Administered Medications: None       Past Medical History:   Diagnosis Date   • Pancreatitis        History reviewed. No pertinent surgical history. History reviewed. No pertinent family history. I have reviewed and agree with the history as documented. E-Cigarette/Vaping     E-Cigarette/Vaping Substances     Social History     Tobacco Use   • Smoking status: Never   • Smokeless tobacco: Never   Substance Use Topics   • Alcohol use: Never   • Drug use: Never       Review of Systems   Constitutional: Negative for chills, fatigue and fever. Respiratory: Negative for shortness of breath. Cardiovascular: Negative for palpitations. Gastrointestinal: Negative for abdominal distention, abdominal pain, diarrhea, nausea and vomiting. Genitourinary: Positive for vaginal bleeding. Negative for dyspareunia, dysuria, flank pain, frequency, hematuria, pelvic pain, urgency and vaginal discharge. Musculoskeletal: Negative for back pain.    Skin: Negative for color change. Neurological: Negative for dizziness, syncope and weakness. All other systems reviewed and are negative. Physical Exam  Physical Exam  Vitals and nursing note reviewed. Exam conducted with a chaperone present. Constitutional:       General: She is awake. She is not in acute distress. Appearance: Normal appearance. She is not ill-appearing, toxic-appearing or diaphoretic. HENT:      Head: Normocephalic. Mouth/Throat:      Lips: Pink. Mouth: Mucous membranes are moist.   Eyes:      General: Vision grossly intact. Cardiovascular:      Rate and Rhythm: Normal rate and regular rhythm. Heart sounds: Normal heart sounds. Pulmonary:      Effort: Pulmonary effort is normal. No respiratory distress. Breath sounds: Normal breath sounds. Abdominal:      General: There is no distension. Palpations: Abdomen is soft. Tenderness: There is no abdominal tenderness. There is no right CVA tenderness or left CVA tenderness. Genitourinary:     General: Normal vulva. Vagina: No vaginal discharge or tenderness. Cervix: Cervical bleeding (scant) present. No cervical motion tenderness, discharge, friability or erythema. Skin:     General: Skin is warm and dry. Capillary Refill: Capillary refill takes less than 2 seconds. Neurological:      Mental Status: She is alert.          Vital Signs  ED Triage Vitals   Temperature Pulse Respirations Blood Pressure SpO2   07/12/23 0930 07/12/23 0930 07/12/23 0930 07/12/23 0930 07/12/23 0930   98.5 °F (36.9 °C) 80 18 145/70 100 %      Temp Source Heart Rate Source Patient Position - Orthostatic VS BP Location FiO2 (%)   07/12/23 0930 07/12/23 0930 07/12/23 0930 07/12/23 0930 --   Oral Monitor Sitting Left arm       Pain Score       07/12/23 1135       No Pain           Vitals:    07/12/23 0930 07/12/23 1135   BP: 145/70 132/62   Pulse: 80 67   Patient Position - Orthostatic VS: Sitting Sitting         Visual Acuity      ED Medications  Medications - No data to display    Diagnostic Studies  Results Reviewed     Procedure Component Value Units Date/Time    Urine culture [[id number]] Collected: 07/12/23 1009    Lab Status: Final result Specimen: Urine, Other Updated: 07/13/23 1202     Urine Culture 60,000-69,000 cfu/ml    Urine Microscopic [[id number]]  (Abnormal) Collected: 07/12/23 1009    Lab Status: Final result Specimen: Urine, Other Updated: 07/12/23 1134     RBC, UA 2-4 /hpf      WBC, UA 1-2 /hpf      Epithelial Cells Moderate /hpf      Bacteria, UA Moderate /hpf      MUCUS THREADS Occasional     URINE COMMENT --    hCG, quantitative [[id number]]  (Abnormal) Collected: 07/12/23 1004    Lab Status: Final result Specimen: Blood from Arm, Left Updated: 07/12/23 1121     HCG, Quant 81,703 mIU/mL     Narrative:       Expected Ranges:    HCG results between 5 and 25 mIU/mL may be indicative of early pregnancy but should be interpreted in light of the total clinical presentation. HCG can rise to detectable levels in mya and post menopausal women (0-11.6 mIU/mL).      Approximate               Approximate HCG  Gestation age          Concentration ( mIU/mL)  _____________          ______________________   [facility]                      HCG values  0.2-1                       5-50  1-2                           2-3                         100-5000  3-4                         500-51795  4-5                         1000-92651  5-6                         [id number]  6-8                         [id number]  8-12                        15888-099104      UA w Reflex to Microscopic w Reflex to Culture [[id number]]  (Abnormal) Collected: 07/12/23 1009    Lab Status: Final result Specimen: Urine, Other Updated: 07/12/23 1104     Color, UA Yellow     Clarity, UA Clear     Specific Gravity, UA 1.010     pH, UA 7.0     Leukocytes, UA 25.0     Nitrite, UA Negative     Protein, UA Negative mg/dl      Glucose, UA Negative mg/dl Ketones, UA Negative mg/dl      Bilirubin, UA Negative     Occult Blood, .0     URINE COMMENT --     UROBILINOGEN UA Negative mg/dL     Comprehensive metabolic panel [[id number]] Collected: 07/12/23 1004    Lab Status: Final result Specimen: Blood from Arm, Left Updated: 07/12/23 1048     Sodium 138 mmol/L      Potassium 3.5 mmol/L      Chloride 104 mmol/L      CO2 26 mmol/L      ANION GAP 8 mmol/L      BUN 9 mg/dL      Creatinine 0.63 mg/dL      Glucose 80 mg/dL      Calcium 9.6 mg/dL      AST 20 U/L      ALT 12 U/L      Alkaline Phosphatase 41 U/L      Total Protein 7.6 g/dL      Albumin 4.4 g/dL      Total Bilirubin 0.63 mg/dL      eGFR 124 ml/min/1.73sq m     Narrative:      National Kidney Disease Foundation guidelines for Chronic Kidney Disease (CKD):   •  Stage 1 with normal or high GFR (GFR > 90 mL/min/1.73 square meters)  •  Stage 2 Mild CKD (GFR = 60-89 mL/min/1.73 square meters)  •  Stage 3A Moderate CKD (GFR = 45-59 mL/min/1.73 square meters)  •  Stage 3B Moderate CKD (GFR = 30-44 mL/min/1.73 square meters)  •  Stage 4 Severe CKD (GFR = 15-29 mL/min/1.73 square meters)  •  Stage 5 End Stage CKD (GFR <15 mL/min/1.73 square meters)  Note: GFR calculation is accurate only with a steady state creatinine    CBC and differential [[id number]]  (Abnormal) Collected: 07/12/23 1004    Lab Status: Final result Specimen: Blood from Arm, Left Updated: 07/12/23 1030     WBC 10.85 Thousand/uL      RBC 4.15 Million/uL      Hemoglobin 12.7 g/dL      Hematocrit 40.0 %      MCV 96 fL      MCH 30.6 pg      MCHC 31.8 g/dL      RDW 12.0 %      MPV 9.6 fL      Platelets 831 Thousands/uL      nRBC 0 /100 WBCs      Neutrophils Relative 75 %      Immat GRANS % 0 %      Lymphocytes Relative 17 %      Monocytes Relative 7 %      Eosinophils Relative 1 %      Basophils Relative 0 %      Neutrophils Absolute 8.19 Thousands/µL      Immature Grans Absolute 0.04 Thousand/uL      Lymphocytes Absolute 1.81 Thousands/µL      Monocytes Absolute 0.74 Thousand/µL      Eosinophils Absolute 0.05 Thousand/µL      Basophils Absolute 0.02 Thousands/µL                  No orders to display              Procedures  Procedures         ED Course  ED Course as of 23 1815      1017 ED [facility] chaperoned. Closed cervical os with scant bleeding. 1033  bpm via transabdominal ultrasound performed by Dr. Margo Bell with my assistance    1125 Rh Factor: Positive  No rhogam required. SBIRT 22yo+    Flowsheet Row Most Recent Value   Initial Alcohol Screen: US AUDIT-C     1. How often do you have a drink containing alcohol? 0 Filed at: 2023 0934   2. How many drinks containing alcohol do you have on a typical day you are drinking? 0 Filed at: 202334   3a. Male UNDER 65: How often do you have five or more drinks on one occasion? 0 Filed at: 2023 0934   3b. FEMALE Any Age, or MALE 65+: How often do you have 4 or more drinks on one occassion? 0 Filed at: 2023   Audit-C Score 0 Filed at: 2023 8647   JON: How many times in the past year have you. .. Used an illegal drug or used a prescription medication for non-medical reasons? Never Filed at: 2023 9070                    Medical Decision Making  Vaginal bleeding without pain in confirmed IUP 6 weeks. Differential includes but is not limited to , subchorionic hematoma, first trimester , heterotopic pregnancy, fibroids. FHT WNL. No abdominal tenderness or distention. Scant bleeding, closed os on exam. Normal hgb. No leukocytosis. UA with bacteruria will treat as patient is pregnant. Rh negative, no rhogam necessary. Patient informed of possibility of threatened miscarriage importance of follow up, voiced understanding. All imaging and/or lab testing discussed with patient, strict return to ED precautions discussed. Patient recommended to follow up promptly with appropriate outpatient provider.  Patient and/or family members verbalizes understanding and agrees with plan. Patient and/or family members were given opportunity to ask questions, all questions were answered at this time. Patient is stable for discharge.     Portions of the record may have been created with voice recognition software. Occasional wrong word or "sound a like" substitutions may have occurred due to the inherent limitations of voice recognition software. Read the chart carefully and recognize, using context, where substitutions have occurred. Amount and/or Complexity of Data Reviewed  Labs: ordered. Decision-making details documented in ED Course. Risk  Prescription drug management. Disposition  Final diagnoses:   Threatened miscarriage in early pregnancy   Asymptomatic bacteriuria during pregnancy     Time reflects when diagnosis was documented in both MDM as applicable and the Disposition within this note     Time User Action Codes Description Comment    7/12/2023 10:40 AM Carmel Cinnamon Add [O20.0] Threatened miscarriage in early pregnancy     7/12/2023 11:40 AM Carmel Cinnamon Add [O99.891,  R82.71] Asymptomatic bacteriuria during pregnancy       ED Disposition     ED Disposition   Discharge    Condition   Stable    Date/Time   Wed Jul 12, 2023 11:42 AM    Comment   Adriana Meza discharge to home/self care.                Follow-up Information     Follow up With Specialties Details Why Contact Info Additional [address] Ob[facility]etric[facility] Gy[facility] Schedule an appointment as soon as possible for a visit  For follow up regarding your symptoms [address] [facility] [address] [place] [id number]  [address] [place] [address], [address] [place] [address], [id number] [phone]          Discharge Medication List as of 7/12/2023 11:44 AM      START taking these medications    Details   cephalexin (KEFLEX) 500 mg capsule Take 1 capsule (500 mg total) by mouth every 8 (eight) hours for 5 days, Starting Wed 7/12/2023, Until Mon 7/17/2023, Normal         CONTINUE these medications which have NOT CHANGED    Details   Prenatal Vit-Fe Fumarate-FA (Prenatal Plus Vitamin/Mineral) 27-1 MG TABS Take 1 tablet by mouth in the morning, Starting Tue 7/11/2023, Normal             Outpatient Discharge Orders   hCG, quantitative   Standing Status: Future Standing Exp.  Date: 07/12/24       PDMP Review     None          ED Provider  Electronically Signed by           Marylene Seal, PA-C  07/14/23 8498

## 2023-07-12 NOTE — DISCHARGE INSTRUCTIONS
Get repeat blood work done in 2 days, take the prescription that was given to you to the  at the main entrance of the hospital and they will direct you. Take antibiotic as prescribed. Follow-up with your OB/GYN. Return to ED for new or worsening symptoms as discussed.

## 2023-07-13 LAB — BACTERIA UR CULT: NORMAL

## 2023-07-26 ENCOUNTER — INITIAL PRENATAL (OUTPATIENT)
Dept: OBGYN CLINIC | Facility: CLINIC | Age: 28
End: 2023-07-26

## 2023-07-26 ENCOUNTER — APPOINTMENT (OUTPATIENT)
Dept: LAB | Facility: HOSPITAL | Age: 28
End: 2023-07-26
Payer: COMMERCIAL

## 2023-07-26 VITALS
BODY MASS INDEX: 30.8 KG/M2 | HEIGHT: 68 IN | DIASTOLIC BLOOD PRESSURE: 77 MMHG | HEART RATE: 83 BPM | WEIGHT: 203.2 LBS | SYSTOLIC BLOOD PRESSURE: 127 MMHG

## 2023-07-26 DIAGNOSIS — Z59.9 FINANCIAL DIFFICULTIES: ICD-10-CM

## 2023-07-26 DIAGNOSIS — Z31.430 ENCOUNTER OF FEMALE FOR TESTING FOR GENETIC DISEASE CARRIER STATUS FOR PROCREATIVE MANAGEMENT: ICD-10-CM

## 2023-07-26 DIAGNOSIS — Z34.91 PRENATAL CARE IN FIRST TRIMESTER: ICD-10-CM

## 2023-07-26 DIAGNOSIS — Z59.41 FOOD INSECURITY: ICD-10-CM

## 2023-07-26 DIAGNOSIS — Z3A.08 8 WEEKS GESTATION OF PREGNANCY: ICD-10-CM

## 2023-07-26 DIAGNOSIS — Z34.91 PRENATAL CARE IN FIRST TRIMESTER: Primary | ICD-10-CM

## 2023-07-26 LAB
ABO GROUP BLD: NORMAL
BACTERIA UR QL AUTO: ABNORMAL /HPF
BASOPHILS # BLD AUTO: 0.03 THOUSANDS/ÂΜL (ref 0–0.1)
BASOPHILS NFR BLD AUTO: 0 % (ref 0–1)
BILIRUB UR QL STRIP: NEGATIVE
BLD GP AB SCN SERPL QL: NEGATIVE
CAOX CRY URNS QL MICRO: ABNORMAL /HPF
CLARITY UR: CLEAR
COLOR UR: ABNORMAL
EOSINOPHIL # BLD AUTO: 0.03 THOUSAND/ÂΜL (ref 0–0.61)
EOSINOPHIL NFR BLD AUTO: 0 % (ref 0–6)
ERYTHROCYTE [DISTWIDTH] IN BLOOD BY AUTOMATED COUNT: 12.3 % (ref 11.6–15.1)
GLUCOSE 1H P 50 G GLC PO SERPL-MCNC: 101 MG/DL (ref 40–134)
GLUCOSE UR STRIP-MCNC: NEGATIVE MG/DL
HBV SURFACE AB SER-ACNC: 81 MIU/ML
HBV SURFACE AG SER QL: NORMAL
HCT VFR BLD AUTO: 37.5 % (ref 34.8–46.1)
HCV AB SER QL: NORMAL
HGB BLD-MCNC: 12 G/DL (ref 11.5–15.4)
HGB UR QL STRIP.AUTO: NEGATIVE
HIV 1+2 AB+HIV1 P24 AG SERPL QL IA: NORMAL
HIV 2 AB SERPL QL IA: NORMAL
HIV1 AB SERPL QL IA: NORMAL
HIV1 P24 AG SERPL QL IA: NORMAL
IMM GRANULOCYTES # BLD AUTO: 0.05 THOUSAND/UL (ref 0–0.2)
IMM GRANULOCYTES NFR BLD AUTO: 0 % (ref 0–2)
KETONES UR STRIP-MCNC: NEGATIVE MG/DL
LEUKOCYTE ESTERASE UR QL STRIP: 100
LYMPHOCYTES # BLD AUTO: 2.15 THOUSANDS/ÂΜL (ref 0.6–4.47)
LYMPHOCYTES NFR BLD AUTO: 15 % (ref 14–44)
MCH RBC QN AUTO: 30.8 PG (ref 26.8–34.3)
MCHC RBC AUTO-ENTMCNC: 32 G/DL (ref 31.4–37.4)
MCV RBC AUTO: 96 FL (ref 82–98)
MONOCYTES # BLD AUTO: 0.76 THOUSAND/ÂΜL (ref 0.17–1.22)
MONOCYTES NFR BLD AUTO: 5 % (ref 4–12)
MUCOUS THREADS UR QL AUTO: ABNORMAL
NEUTROPHILS # BLD AUTO: 11.66 THOUSANDS/ÂΜL (ref 1.85–7.62)
NEUTS SEG NFR BLD AUTO: 80 % (ref 43–75)
NITRITE UR QL STRIP: NEGATIVE
NON-SQ EPI CELLS URNS QL MICRO: ABNORMAL /HPF
NRBC BLD AUTO-RTO: 0 /100 WBCS
PH UR STRIP.AUTO: 6 [PH]
PLATELET # BLD AUTO: 258 THOUSANDS/UL (ref 149–390)
PMV BLD AUTO: 9.5 FL (ref 8.9–12.7)
PROT UR STRIP-MCNC: NEGATIVE MG/DL
RBC # BLD AUTO: 3.89 MILLION/UL (ref 3.81–5.12)
RBC #/AREA URNS AUTO: ABNORMAL /HPF
RH BLD: POSITIVE
RUBV IGG SERPL IA-ACNC: 45.5 IU/ML
SP GR UR STRIP.AUTO: 1.02 (ref 1–1.04)
SPECIMEN EXPIRATION DATE: NORMAL
TREPONEMA PALLIDUM IGG+IGM AB [PRESENCE] IN SERUM OR PLASMA BY IMMUNOASSAY: NORMAL
UROBILINOGEN UA: NEGATIVE MG/DL
WBC # BLD AUTO: 14.68 THOUSAND/UL (ref 4.31–10.16)
WBC #/AREA URNS AUTO: ABNORMAL /HPF

## 2023-07-26 PROCEDURE — 86762 RUBELLA ANTIBODY: CPT

## 2023-07-26 PROCEDURE — 85025 COMPLETE CBC W/AUTO DIFF WBC: CPT

## 2023-07-26 PROCEDURE — 81001 URINALYSIS AUTO W/SCOPE: CPT

## 2023-07-26 PROCEDURE — 86901 BLOOD TYPING SEROLOGIC RH(D): CPT

## 2023-07-26 PROCEDURE — 86850 RBC ANTIBODY SCREEN: CPT

## 2023-07-26 PROCEDURE — 86706 HEP B SURFACE ANTIBODY: CPT

## 2023-07-26 PROCEDURE — 87389 HIV-1 AG W/HIV-1&-2 AB AG IA: CPT

## 2023-07-26 PROCEDURE — OBC

## 2023-07-26 PROCEDURE — 87086 URINE CULTURE/COLONY COUNT: CPT

## 2023-07-26 PROCEDURE — 86803 HEPATITIS C AB TEST: CPT

## 2023-07-26 PROCEDURE — 86780 TREPONEMA PALLIDUM: CPT

## 2023-07-26 PROCEDURE — 82950 GLUCOSE TEST: CPT

## 2023-07-26 PROCEDURE — 87340 HEPATITIS B SURFACE AG IA: CPT

## 2023-07-26 PROCEDURE — 86900 BLOOD TYPING SEROLOGIC ABO: CPT

## 2023-07-26 PROCEDURE — 36415 COLL VENOUS BLD VENIPUNCTURE: CPT

## 2023-07-26 PROCEDURE — 83020 HEMOGLOBIN ELECTROPHORESIS: CPT

## 2023-07-26 SDOH — ECONOMIC STABILITY - FOOD INSECURITY: FOOD INSECURITY: Z59.41

## 2023-07-26 SDOH — ECONOMIC STABILITY - INCOME SECURITY: PROBLEM RELATED TO HOUSING AND ECONOMIC CIRCUMSTANCES, UNSPECIFIED: Z59.9

## 2023-07-26 NOTE — PROGRESS NOTES
OBSTETRIC INTAKE VISIT    Rene Iqbal presents today for initial OB visit and intake at [address] [place] [address]. LMP - 23. History obtained from patient and she reports it as follows:    Past Medical History:   Diagnosis Date   • Anxiety    • Pancreatitis      History reviewed. No pertinent surgical history. OB History    Para Term  AB Living   2 0 0 0 1 0   SAB IAB Ectopic Multiple Live Births   1 0 0 0 0      # Outcome Date GA Lbr Javier/2nd Weight Sex Delivery Anes PTL Lv   2 Current            1 SAB  8w0d            Social History     Tobacco Use   • Smoking status: Never   • Smokeless tobacco: Never   Vaping Use   • Vaping Use: Never used   Substance Use Topics   • Alcohol use: Never   • Drug use: Never       Current Outpatient Medications   Medication Instructions   • Prenatal Vit-Fe Fumarate-FA (Prenatal Plus Vitamin/Mineral) 27-1 MG TABS 1 tablet, Oral, Daily       Allergies   Allergen Reactions   • Shellfish-Derived Products - Food Allergy Dizziness     Nausea, throat closes       Vitals: /77 (BP Location: Left arm, Patient Position: Sitting, Cuff Size: Standard)   Pulse 83   Ht 5' 8" (1.727 m)   Wt 92.2 kg (203 lb 3.2 oz)   LMP 2023 (Exact Date)   BMI 30.90 kg/m²     Review of Systems:  Denies vaginal bleeding or leaking fluid. Denies abdominal/pelvic pain or contractions. Plan:  1. OB labwork ordered today to include Prenatal Panel, HCV antibody, Hgb fractionation cascade, Prenatal Carrier Screen Panel. Other labs include Glucose 1H PG. Advised patient to have drawn within the next few days. 2. Will help pt schedule ultrasound with MFM. 3. Return 23 for H&P prenatal visit. 4. Referrals placed for [facility], , and Nurse [facility] [place]. 5. Given vaccines: None due. 6. Patient's depression screening was assessed with a PHQ-2 score of 0. Their PHQ-9 score was 1. Clinically patient does not have depression. No treatment is required.    will call patient.  Reviewed the following educational topics with patient:   -routine prenatal visit/ultrasound/labwork schedule   -hospital for delivery and office phone/answering service contact information   -nutritional demands of pregnancy, healthy dietary habits   -listeria, toxoplasmosis, seafood precautions   -weight gain expectations (based on pre-pregnant BMI)   -exercise, rest, and sexual activity during pregnancy   -abstinence from alcohol, tobacco, and illegal drugs   -common discomforts of pregnancy and appropriate management   -OTC medications safe to use in pregnancy   -genetic screening options   -vaccines in pregnancy   -symptoms to report to OB provider    -signs of PTL and pre-eclampsia    -vaginal bleeding/leaking of fluid    -severe n/v-unable to tolerate ANY food/fluids for more than 24 hours

## 2023-07-26 NOTE — LETTER
7/26/2023      This letter is to confirm that Traci John is pregnant and is under our care. Her Estimated Date of Delivery: 2/29/24. If you have any questions or concerns, please contact our office.   Thank you,    DANIELLE Hernandez

## 2023-07-26 NOTE — LETTER
[address] [place] [address] REFERRAL      Date: 7/26/2023    Patient name: Jack Driscoll    YOB: 1995    Estimated Date of Delivery: 2/29/24      /77 (BP Location: Left arm, Patient Position: Sitting, Cuff Size: Standard)   Pulse 83   Ht 5' 8" (1.727 m)   Wt 92.2 kg (203 lb 3.2 oz)   LMP 05/25/2023 (Exact Date)   BMI 30.90 kg/m²         Thank you,  Miroslava Bueno, [address] [place] [address] [address] [place] [address] locations:   1. [facility]       [address] [facility]       [facility], [address] [facility]       Phone: [phone]       Fax: [phone]     2.  [address] [facility]       [address] [facility], [address] [facility]       Phone: [phone]       Fax: [phone]

## 2023-07-26 NOTE — PATIENT INSTRUCTIONS
SENALES SUSHIL EL EMBARAZO  Llame a nuestra oficina al [phone] para cualquiera de los siguientes:    1. sangrado vaginal  2. Dolor abdominal hawa que no desaparece. 3. Fiebre (más de 100.4 y no se kirill con Tylenol)  4. Vómitos persistentes que pacheco más de 24 horas. 5. dolor de pecho  6. Dolor o ardor al orinar. 7. Dolor de vanessa severo que no se resuelve con Tylenol  8. Visión borrosa o haven puntos en bartholomew visión. 9. Hinchazón repentina de bartholomew maria luz o salina. 10. Enrojecimiento, hinchazón o dolor en rocky pierna. 11. Un aumento de peso repentino en pocos días. 12. Contar los movimientos fetales del bebé. (después de 28 semanas o el sexto mes de embarazo)  15. Rocky pérdida de líquido acuoso de la vagina: puede ser un chorro, un goteo o rocky humedad continua  14.  Después de 20 semanas de embarazo, calambres rítmicos (más de 4 por hora) o menstruales darío dolor Margarette Rodriguez / Bhargav Vasques

## 2023-07-28 ENCOUNTER — TELEPHONE (OUTPATIENT)
Dept: PERINATAL CARE | Facility: OTHER | Age: 28
End: 2023-07-28

## 2023-07-28 LAB — BACTERIA UR CULT: NORMAL

## 2023-07-28 NOTE — TELEPHONE ENCOUNTER
Spoke with patient and confirmed her MFM appointment had to be rescheduled. Patient verbalized understanding of new time, date and location of appointment.

## 2023-07-31 LAB
HGB A MFR BLD: 2.6 % (ref 1.8–3.2)
HGB A MFR BLD: 97.4 % (ref 96.4–98.8)
HGB F MFR BLD: 0 % (ref 0–2)
HGB FRACT BLD-IMP: NORMAL
HGB S MFR BLD: 0 %

## 2023-08-01 ENCOUNTER — APPOINTMENT (OUTPATIENT)
Dept: LAB | Facility: HOSPITAL | Age: 28
End: 2023-08-01
Attending: NURSE PRACTITIONER
Payer: COMMERCIAL

## 2023-08-01 DIAGNOSIS — Z31.430 ENCOUNTER OF FEMALE FOR TESTING FOR GENETIC DISEASE CARRIER STATUS FOR PROCREATIVE MANAGEMENT: ICD-10-CM

## 2023-08-01 DIAGNOSIS — Z3A.08 8 WEEKS GESTATION OF PREGNANCY: ICD-10-CM

## 2023-08-01 DIAGNOSIS — Z34.91 PRENATAL CARE IN FIRST TRIMESTER: ICD-10-CM

## 2023-08-01 PROCEDURE — 36415 COLL VENOUS BLD VENIPUNCTURE: CPT

## 2023-08-01 PROCEDURE — 81220 CFTR GENE COM VARIANTS: CPT

## 2023-08-01 PROCEDURE — 81329 SMN1 GENE DOS/DELETION ALYS: CPT

## 2023-08-07 LAB
CITATION REF LAB TEST: NORMAL
CLINICAL INFO: NORMAL
ETHNIC BACKGROUND STATED: NORMAL
GENE DIS ANL CARRIER INTERP-IMP: NORMAL
GENE MUT TESTED BLD/T: NORMAL
LAB DIRECTOR NAME PROVIDER: NORMAL
REASON FOR REFERRAL (NARRATIVE): NORMAL
RECOMMENDATION PATIENT DOC-IMP: NORMAL
REF LAB TEST METHOD: NORMAL
SERVICE CMNT-IMP: NORMAL
SMN1 GENE MUT ANL BLD/T: NORMAL
SPECIMEN SOURCE: NORMAL

## 2023-08-08 LAB
CF COMMENT: NORMAL
CFTR MUT ANL BLD/T: NORMAL

## 2023-08-09 ENCOUNTER — INITIAL PRENATAL (OUTPATIENT)
Dept: OBGYN CLINIC | Facility: CLINIC | Age: 28
End: 2023-08-09

## 2023-08-09 VITALS
WEIGHT: 204 LBS | HEART RATE: 88 BPM | DIASTOLIC BLOOD PRESSURE: 75 MMHG | HEIGHT: 68 IN | SYSTOLIC BLOOD PRESSURE: 126 MMHG | BODY MASS INDEX: 30.92 KG/M2

## 2023-08-09 DIAGNOSIS — Z34.91 PRENATAL CARE IN FIRST TRIMESTER: Primary | ICD-10-CM

## 2023-08-09 DIAGNOSIS — B37.31 VAGINAL CANDIDIASIS: ICD-10-CM

## 2023-08-09 DIAGNOSIS — Z3A.10 10 WEEKS GESTATION OF PREGNANCY: ICD-10-CM

## 2023-08-09 PROBLEM — O99.210 OBESITY AFFECTING PREGNANCY: Status: ACTIVE | Noted: 2023-08-09

## 2023-08-09 PROCEDURE — 87591 N.GONORRHOEAE DNA AMP PROB: CPT | Performed by: NURSE PRACTITIONER

## 2023-08-09 PROCEDURE — PNV: Performed by: NURSE PRACTITIONER

## 2023-08-09 PROCEDURE — 87491 CHLMYD TRACH DNA AMP PROBE: CPT | Performed by: NURSE PRACTITIONER

## 2023-08-09 PROCEDURE — G0145 SCR C/V CYTO,THINLAYER,RESCR: HCPCS | Performed by: NURSE PRACTITIONER

## 2023-08-09 RX ORDER — FLUCONAZOLE 150 MG/1
150 TABLET ORAL ONCE
Qty: 1 TABLET | Refills: 0 | Status: SHIPPED | OUTPATIENT
Start: 2023-08-09 | End: 2023-08-09

## 2023-08-09 NOTE — LETTER
8/15/2023    To Rosie Carter  : 1995      Esta carta es para informarle que debi resultados recientes de la prueba de Papanicolaou fueron revisados por mí y son Suzzanna Sos.   Nos vemos en 1 año para bartholomew [address] [facility]

## 2023-08-09 NOTE — PROGRESS NOTES
Norris Grider presents today for H&P OB visit at [address] [place] [address]. KQ=522/75  Wt=92.5 kg (204 lb); Body mass index is 31.02 kg/m².; TWG=0.454 kg (1 lb)  Fetal Heart Rate: 162  Abdomen: soft, non-tender. She reports vaginal discharge and itching. On exam vaginal candidiasis is noted. Given Rx Diflucan. Denies vaginal bleeding or leaking of fluid. Pap/GC/CT collected today. Scheduled for ultrasound 23. Reviewed common discomforts of pregnancy in first trimester and warning signs. Advised to continue medications and return in 4 weeks. Current Outpatient Medications   Medication Instructions   • fluconazole (DIFLUCAN) 150 mg, Oral, Once   • Prenatal Vit-Fe Fumarate-FA (Prenatal Plus Vitamin/Mineral) 27-1 MG TABS 1 tablet, Oral, Daily       Laboratory workup: initial OB labs (done 23)    Genetic Screening: scheduled w/MFM for 23    Vaccinations: influenza (will offer during flu season); Tdap (will offer after 27 weeks);  COVID-19 (rec'd x3 doses - will bring documentation)    Postpartum contraception: considering Mirena IUD    Fetal Ultrasounds:  23 (7w2d) EDC confirmed      G2 Problems (from 23 to present)     Problem Noted Resolved    Obesity affecting pregnancy 2023 by DANIELLE Faith No    Overview Signed 2023  9:44 AM by DANIELLE Faith     Pre-pregnant BMI=30.87  Needs early GDM screen - done WNL  Serial growth scans               Past Medical History:   Diagnosis Date   • Anxiety    • Pancreatitis      Past Surgical History:   Procedure Laterality Date   • NO PAST SURGERIES       OB History        2    Para   0    Term   0       0    AB   1    Living   0       SAB   1    IAB   0    Ectopic   0    Multiple   0    Live Births   0               Social History     Tobacco Use   • Smoking status: Never   • Smokeless tobacco: Never   Vaping Use   • Vaping Use: Never used   Substance Use Topics   • Alcohol use: Never   • Drug use: Never

## 2023-08-09 NOTE — PATIENT INSTRUCTIONS
Bernadette por bartholomew confianza en nuestro equipo. Wilhemenia Filbert y agradecemos debi comentarios. Si recibe beryl encuesta nuestra, tómese unos momentos para informarnos cómo estamos. DANIELLE Acosta       [facility] [place]  Llame a Rana Heads al [phone] para cualquiera de los siguientes:    1. sangrado vaginal  2. Dolor abdominal hawa que no desaparece. 3. Fiebre (más de 100.4 y no se kirill con Tylenol)  4. Vómitos persistentes que pacheco más de 24 horas. 5. dolor de pecho  6. Dolor o ardor al orinar. 7. Dolor de vanessa severo que no se resuelve con Tylenol  8. Visión borrosa o haven puntos en bartholomew visión. 9. Hinchazón repentina de bartholomew maria luz o salina. 10. Enrojecimiento, hinchazón o dolor en beryl pierna. 11. Un aumento de peso repentino en pocos días. 12. Contar los movimientos fetales del bebé. (después de 28 semanas o el sexto mes de embarazo)  15. Beryl pérdida de líquido acuoso de la vagina: puede ser un chorro, un goteo o beryl humedad continua  14.  Después de 20 semanas de embarazo, calambres rítmicos (más de 4 por hora) o menstruales darío dolor Huerfano Grosser / Aisha Montane

## 2023-08-11 LAB
C TRACH DNA SPEC QL NAA+PROBE: NEGATIVE
N GONORRHOEA DNA SPEC QL NAA+PROBE: NEGATIVE

## 2023-08-15 LAB
LAB AP GYN PRIMARY INTERPRETATION: NORMAL
Lab: NORMAL

## 2023-08-21 ENCOUNTER — PATIENT OUTREACH (OUTPATIENT)
Dept: OBGYN CLINIC | Facility: CLINIC | Age: 28
End: 2023-08-21

## 2023-08-21 NOTE — PROGRESS NOTES
[facility] received a new referral in regard to new pre jermaine pt. [facility] contacted pt using [employer] services,  # [id number]. [facility] introduced self and role. Pt agreed to complete prenatal psychosocial assessment via phone. Per pt she has had one previous pregnancies prior to this current one that ended in a miscarriage. Pt was very happy to learn about the pregnancy and per pt her family was also happy about the news. Per pt there is distance between herself and FOB. Per pt she has no mental health diagnosis or concerns. Pt has felt very content during this pregnancy. Pt lives by herself and reports no concerns about her home. 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 Pt receives MA and WIC. Pt gets to and from her appts with a car. Pt shares that her mother is her support person and will be helping pt when she goes home after the delivery. Pt reports no use of cigarettes, alcohol, or drugs. No concerns noted. [facility] will send a resource packet to pt via mail. Pt was advised to contact [facility] with any future concerns. [facility] will remain available as needed.

## 2023-08-22 ENCOUNTER — PATIENT OUTREACH (OUTPATIENT)
Dept: OBGYN CLINIC | Facility: CLINIC | Age: 28
End: 2023-08-22

## 2023-08-30 ENCOUNTER — ROUTINE PRENATAL (OUTPATIENT)
Age: 28
End: 2023-08-30
Payer: COMMERCIAL

## 2023-08-30 VITALS
DIASTOLIC BLOOD PRESSURE: 56 MMHG | BODY MASS INDEX: 31.64 KG/M2 | SYSTOLIC BLOOD PRESSURE: 122 MMHG | HEIGHT: 68 IN | HEART RATE: 102 BPM | WEIGHT: 208.8 LBS

## 2023-08-30 DIAGNOSIS — Z36.9 UNSPECIFIED ANTENATAL SCREENING: ICD-10-CM

## 2023-08-30 DIAGNOSIS — Z36.82 ENCOUNTER FOR (NT) NUCHAL TRANSLUCENCY SCAN: Primary | ICD-10-CM

## 2023-08-30 DIAGNOSIS — O99.211 OBESITY AFFECTING PREGNANCY IN FIRST TRIMESTER: ICD-10-CM

## 2023-08-30 DIAGNOSIS — O34.10 UTERINE FIBROID IN PREGNANCY: ICD-10-CM

## 2023-08-30 DIAGNOSIS — D25.9 UTERINE FIBROID IN PREGNANCY: ICD-10-CM

## 2023-08-30 DIAGNOSIS — Z34.91 PRENATAL CARE IN FIRST TRIMESTER: ICD-10-CM

## 2023-08-30 DIAGNOSIS — Z33.1 PREGNANT STATE, INCIDENTAL: ICD-10-CM

## 2023-08-30 DIAGNOSIS — Z3A.13 13 WEEKS GESTATION OF PREGNANCY: ICD-10-CM

## 2023-08-30 PROCEDURE — 76801 OB US < 14 WKS SINGLE FETUS: CPT | Performed by: STUDENT IN AN ORGANIZED HEALTH CARE EDUCATION/TRAINING PROGRAM

## 2023-08-30 PROCEDURE — 76813 OB US NUCHAL MEAS 1 GEST: CPT | Performed by: STUDENT IN AN ORGANIZED HEALTH CARE EDUCATION/TRAINING PROGRAM

## 2023-08-30 RX ORDER — ASPIRIN 81 MG/1
162 TABLET, CHEWABLE ORAL DAILY
Qty: 180 TABLET | Refills: 2 | Status: SHIPPED | OUTPATIENT
Start: 2023-08-30

## 2023-08-30 NOTE — LETTER
August 30, 2023     Iesha Parker, [address] [address]    Patient: Codi Rowley   YOB: 1995   Date of Visit: 8/30/2023       Dear Dr. Blackmon Files:    Thank you for referring Codi Rowley to me for evaluation. Below are my notes for this consultation. If you have questions, please do not hesitate to call me. I look forward to following your patient along with you. Sincerely,        Sam Apodaca MD        CC: No Recipients    Sam Apodaca MD  8/30/2023  9:32 AM  Sign when Signing Visit  [address] [place] [address] Dr: Ms. Jose Corona was seen today for nuchal translucency ultrasound. See ultrasound report under "OB Procedures" tab. Physical Exam  Constitutional:       General: She is not in acute distress. Appearance: Normal appearance. HENT:      Head: Normocephalic and atraumatic. Eyes:      Extraocular Movements: Extraocular movements intact. Cardiovascular:      Rate and Rhythm: Normal rate. Pulmonary:      Effort: Pulmonary effort is normal. No respiratory distress. Skin:     Findings: No erythema or rash. Neurological:      Mental Status: She is alert and oriented to person, place, and time. Psychiatric:         Mood and Affect: Mood normal.         Behavior: Behavior normal.         Please don't hesitate to contact our office with any concerns or questions.   -Sam Apodaca MD

## 2023-09-05 PROBLEM — R03.0 ELEVATED BLOOD PRESSURE READING WITHOUT DIAGNOSIS OF HYPERTENSION: Status: ACTIVE | Noted: 2023-09-05

## 2023-09-05 PROBLEM — Z3A.14 14 WEEKS GESTATION OF PREGNANCY: Status: ACTIVE | Noted: 2023-08-09

## 2023-09-05 NOTE — PROGRESS NOTES
OB/GYN Prenatal Visit    ASSESSMENT / PLAN:  1. 14 weeks gestation of pregnancy  Assessment & Plan:  - Patient feeling well, explained risks and benefits of epidural and ASA  - FHR - 150  - MSAFP ordered today, NIPS and carrier screening wnl  - Considering Mirena IUD and epidural  - Return to clinic in 4 weeks    Orders:  -     Alpha fetoprotein, maternal; Future    2. Prenatal care in second trimester    3. Elevated blood pressure reading without diagnosis of hypertension  Assessment & Plan:  - Elevated blood pressure on 23 of 142/64  - Pressure normal today - 139/71      4. Obesity affecting pregnancy in second trimester  Assessment & Plan:  - Counseled patient on recommendation of aspirin due to BMI >30 to prevent preeclampsia, GDM, , VTE and fetal risks of miscarriage, fetal anomolies, stillbirth, macrosomia, and impaired growth  - Continue daily Aspirin, patient would like to just take 81mg        SUBJECTIVE:  Mallorie Reynolds is a 34 y.o. Lindy People at 14w6d here for prenatal visit. She has no obstetric complaints and denies pelvic pain, cramping/contractions, vaginal bleeding, loss of fluid, or decreased fetal movement. Today she wanted clarification about why she is on the Aspirin, as well as information about the epidural.    OBJECTIVE:  Vitals:    23 1340   BP: 139/71   Pulse: 93     FHT: 150 bpm    Physical Exam:    General: Well appearing, no distress  Respiratory: Unlabored breathing  Cardiovascular: Regular rate. Abdomen: Soft, gravid, nontender  Extremities: Warm and well perfused. Non tender.       Jewel Mccray MD  2023  5:04 PM

## 2023-09-05 NOTE — ASSESSMENT & PLAN NOTE
- Patient feeling well, explained risks and benefits of epidural and ASA  - FHR - 150  - MSAFP ordered today, NIPS and carrier screening wnl  - Considering Mirena IUD and epidural  - Return to clinic in 4 weeks

## 2023-09-06 ENCOUNTER — ROUTINE PRENATAL (OUTPATIENT)
Dept: OBGYN CLINIC | Facility: CLINIC | Age: 28
End: 2023-09-06

## 2023-09-06 VITALS
BODY MASS INDEX: 30.95 KG/M2 | HEIGHT: 68 IN | HEART RATE: 93 BPM | WEIGHT: 204.2 LBS | DIASTOLIC BLOOD PRESSURE: 71 MMHG | SYSTOLIC BLOOD PRESSURE: 139 MMHG

## 2023-09-06 DIAGNOSIS — Z3A.14 14 WEEKS GESTATION OF PREGNANCY: Primary | ICD-10-CM

## 2023-09-06 DIAGNOSIS — O99.212 OBESITY AFFECTING PREGNANCY IN SECOND TRIMESTER: ICD-10-CM

## 2023-09-06 DIAGNOSIS — R03.0 ELEVATED BLOOD PRESSURE READING WITHOUT DIAGNOSIS OF HYPERTENSION: ICD-10-CM

## 2023-09-06 DIAGNOSIS — Z34.92 PRENATAL CARE IN SECOND TRIMESTER: ICD-10-CM

## 2023-09-06 PROCEDURE — PNV: Performed by: OBSTETRICS & GYNECOLOGY

## 2023-09-06 NOTE — LETTER
September 6, 2023     Patient: Ciera Wall  YOB: 1995  Date of Visit: 9/6/2023      To Whom it May Concern:    Ciera Wall is under my professional care. Giovany Shaw was seen in my office on 9/6/2023. It is recommended that Giovany Shaw not lift over 20 pounds and recommend that her workday begin at 7 am to provide adequate rest.    If you have any questions or concerns, please don't hesitate to call.        Sincerely,       Deshaun Valdez MD

## 2023-09-06 NOTE — ASSESSMENT & PLAN NOTE
- Counseled patient on recommendation of aspirin due to BMI >30 to prevent preeclampsia, GDM, , VTE and fetal risks of miscarriage, fetal anomolies, stillbirth, macrosomia, and impaired growth  - Continue daily Aspirin, patient would like to just take 81mg

## 2023-09-12 ENCOUNTER — APPOINTMENT (OUTPATIENT)
Dept: LAB | Facility: HOSPITAL | Age: 28
End: 2023-09-12
Payer: COMMERCIAL

## 2023-09-12 DIAGNOSIS — Z3A.14 14 WEEKS GESTATION OF PREGNANCY: ICD-10-CM

## 2023-09-12 PROCEDURE — 82105 ALPHA-FETOPROTEIN SERUM: CPT

## 2023-09-12 PROCEDURE — 36415 COLL VENOUS BLD VENIPUNCTURE: CPT

## 2023-09-14 LAB
2ND TRIMESTER 4 SCREEN SERPL-IMP: NORMAL
AFP ADJ MOM SERPL: 0.9
AFP INTERP AMN-IMP: NORMAL
AFP INTERP SERPL-IMP: NORMAL
AFP INTERP SERPL-IMP: NORMAL
AFP SERPL-MCNC: 27 NG/ML
AGE AT DELIVERY: 26.6 YR
GA METHOD: NORMAL
GA: 15.7 WEEKS
IDDM PATIENT QL: NO
MULTIPLE PREGNANCY: NO
NEURAL TUBE DEFECT RISK FETUS: NORMAL %

## 2023-09-18 ENCOUNTER — TELEPHONE (OUTPATIENT)
Dept: OBGYN CLINIC | Facility: CLINIC | Age: 28
End: 2023-09-18

## 2023-10-04 ENCOUNTER — ROUTINE PRENATAL (OUTPATIENT)
Dept: OBGYN CLINIC | Facility: CLINIC | Age: 28
End: 2023-10-04

## 2023-10-04 VITALS
HEART RATE: 92 BPM | HEIGHT: 68 IN | WEIGHT: 214.2 LBS | SYSTOLIC BLOOD PRESSURE: 116 MMHG | DIASTOLIC BLOOD PRESSURE: 77 MMHG | BODY MASS INDEX: 32.46 KG/M2

## 2023-10-04 DIAGNOSIS — Z34.92 PRENATAL CARE IN SECOND TRIMESTER: Primary | ICD-10-CM

## 2023-10-04 PROBLEM — Z3A.18 18 WEEKS GESTATION OF PREGNANCY: Status: ACTIVE | Noted: 2023-08-09

## 2023-10-04 PROCEDURE — PNV: Performed by: NURSE PRACTITIONER

## 2023-10-04 PROCEDURE — 90686 IIV4 VACC NO PRSV 0.5 ML IM: CPT | Performed by: NURSE PRACTITIONER

## 2023-10-04 PROCEDURE — 90471 IMMUNIZATION ADMIN: CPT | Performed by: NURSE PRACTITIONER

## 2023-10-04 NOTE — PROGRESS NOTES
Debbie Johnson presents today for routine OB visit at 18w6d. Blood Pressure: 116/77  Wt=97.2 kg (214 lb 3.2 oz); Body mass index is 32.57 kg/m².; TWG=5.08 kg (11 lb 3.2 oz)  Fetal Heart Rate: 152  Abdomen: gravid, soft, non-tender. She reports GI upset at times. Denies uterine contractions or persistent cramping. Denies vaginal bleeding or leaking of fluid. Reports fetal movement. Scheduled for ultrasound 10/18/23. Reviewed common discomforts of pregnancy in second trimester and warning signs. Advised to continue medications and return in 4 weeks. Current Outpatient Medications   Medication Instructions   • aspirin 162 mg, Oral, Daily   • Prenatal Vit-Fe Fumarate-FA (Prenatal Plus Vitamin/Mineral) 27-1 MG TABS 1 tablet, Oral, Daily       Laboratory workup: initial OB labs (done 7/26/23)    Genetic Screening: NIPS negative, MSAFP negative    Vaccinations: influenza (given 10/4/23); Tdap (will offer after 27 weeks);  COVID-19 (rec'd 8/9/23 & 8/30/21)    Postpartum contraception: desires Nexplanon    Fetal Ultrasounds:  7/11/23 (7w2d) EDC confirmed  8/30/23 (13w6d) NT WNL, EDENILSON fibroid (5.0 x 6.6 x 6.8cm)      G2 Problems (from 07/11/23 to present)     Problem Noted Resolved    Uterine fibroid in pregnancy 8/30/2023 by Sam Lujan MD No    Overview Signed 10/4/2023  9:50 AM by Kristofer PYLE fibroid (5.0 x 6.6 x 6.8cm)  Serial growth scans         Obesity affecting pregnancy 8/9/2023 by DANIELLE Kennedy No    Overview Signed 8/9/2023  9:44 AM by DANIELLE Kennedy     Pre-pregnant BMI=30.87  Needs early GDM screen - done WNL  Serial growth scans

## 2023-10-04 NOTE — PATIENT INSTRUCTIONS
Bernadette por bartholomew confianza en nuestro equipo. Serafin Dawn y agrfloremos debi comentarios. Si recibe rocky encuesta nuestra, tómese unos momentos para informarnos cómo estamos. DANIELLE Jaeger       [facility] [place]  Llame a Vinay Channel al [phone] para cualquiera de los siguientes:    1. sangrado vaginal  2. Dolor abdominal hawa que no desaparece. 3. Fiebre (más de 100.4 y no se kirill con Tylenol)  4. Vómitos persistentes que pacheco más de 24 horas. 5. dolor de pecho  6. Dolor o ardor al orinar. 7. Dolor de vanessa severo que no se resuelve con Tylenol  8. Visión borrosa o haven puntos en bartholomew visión. 9. Hinchazón repentina de bartholomew maria luz o salina. 10. Enrojecimiento, hinchazón o dolor en rocky pierna. 11. Un aumento de peso repentino en pocos días. 12. Contar los movimientos fetales del bebé. (después de 28 semanas o el sexto mes de embarazo)  15. Rocky pérdida de líquido acuoso de la vagina: puede ser un chorro, un goteo o rocky humedad continua  14.  Después de 20 semanas de embarazo, calambres rítmicos (más de 4 por hora) o menstruales darío alisa Boykin / Neel Carlson

## 2023-10-13 DIAGNOSIS — Z32.01 POSITIVE PREGNANCY TEST: ICD-10-CM

## 2023-10-17 RX ORDER — VITAMIN A ACETATE, .BETA.-CAROTENE, ASCORBIC ACID, CHOLECALCIFEROL, .ALPHA.-TOCOPHEROL ACETATE, DL-, THIAMINE MONONITRATE, RIBOFLAVIN, NIACINAMIDE, PYRIDOXINE HYDROCHLORIDE, FOLIC ACID, CYANOCOBALAMIN, CALCIUM CARBONATE, FERROUS FUMARATE, ZINC OXIDE, CUPRIC OXIDE 9.9; 120; 920; 200; 400; 2; 12; 27; 1; 20; 10; 3; 1.84; 3080; 25 MG/1; MG/1; [IU]/1; MG/1; [IU]/1; MG/1; UG/1; MG/1; MG/1; MG/1; MG/1; MG/1; MG/1; [IU]/1; MG/1
1 TABLET, FILM COATED ORAL DAILY
Qty: 90 TABLET | Refills: 0 | Status: SHIPPED | OUTPATIENT
Start: 2023-10-17

## 2023-10-18 ENCOUNTER — ROUTINE PRENATAL (OUTPATIENT)
Dept: PERINATAL CARE | Facility: CLINIC | Age: 28
End: 2023-10-18
Payer: COMMERCIAL

## 2023-10-18 VITALS
HEART RATE: 92 BPM | SYSTOLIC BLOOD PRESSURE: 120 MMHG | DIASTOLIC BLOOD PRESSURE: 60 MMHG | WEIGHT: 218.4 LBS | BODY MASS INDEX: 33.1 KG/M2 | HEIGHT: 68 IN

## 2023-10-18 DIAGNOSIS — Z36.86 ENCOUNTER FOR ANTENATAL SCREENING FOR CERVICAL LENGTH: ICD-10-CM

## 2023-10-18 DIAGNOSIS — O99.212 OTHER OBESITY AFFECTING PREGNANCY IN SECOND TRIMESTER: Primary | ICD-10-CM

## 2023-10-18 DIAGNOSIS — Z3A.20 20 WEEKS GESTATION OF PREGNANCY: ICD-10-CM

## 2023-10-18 DIAGNOSIS — E66.8 OTHER OBESITY AFFECTING PREGNANCY IN SECOND TRIMESTER: Primary | ICD-10-CM

## 2023-10-18 PROCEDURE — 76817 TRANSVAGINAL US OBSTETRIC: CPT | Performed by: OBSTETRICS & GYNECOLOGY

## 2023-10-18 PROCEDURE — 76811 OB US DETAILED SNGL FETUS: CPT | Performed by: OBSTETRICS & GYNECOLOGY

## 2023-10-18 NOTE — PROGRESS NOTES
Ultrasound Probe Disinfection    A transvaginal ultrasound was performed. Prior to use, disinfection was performed with High Level Disinfection Process ([employer]). Probe serial number [id number]: [id number] was used.       Rudi Crain  10/18/23  12:45 PM

## 2023-10-18 NOTE — PROGRESS NOTES
The patient was seen today for an ultrasound. Please see ultrasound report (located under Ob Procedures) for additional details. Thank you very much for allowing us to participate in the care of this very nice patient. Should you have any questions, please do not hesitate to contact me. Jama Ortiz MD [place]  Attending Physician, [address] [place] [address]

## 2023-10-27 PROBLEM — Z3A.22 22 WEEKS GESTATION OF PREGNANCY: Status: ACTIVE | Noted: 2023-08-09

## 2023-10-29 NOTE — PROGRESS NOTES
OB/GYN Prenatal Visit    ASSESSMENT / PLAN:  1. 22 weeks gestation of pregnancy  Assessment & Plan:  Patient with no OB complaints  Up to date on labs and vaccines  Unsure of contraception plan, [email or web address] org info given  Patient will attend [facility] ultrasound tomorrow on 11/2 for complete anatomy scan  Follow-up in clinic in 4 weeks      2. Prenatal care in second trimester      SUBJECTIVE:  Clark Lo is a 34 y.o. Antonietta Chelita at 22w6d here for prenatal visit. She has no obstetric complaints and denies pelvic pain, cramping/contractions, vaginal bleeding, loss of fluid, or decreased fetal movement. She does complain on increased right sided sciatica pain. She is still undecided on what form of contraception she would like after delivery. OBJECTIVE:  Vitals:    11/01/23 1315   BP: 117/80   Pulse: 86       FHT: 149 bpm  Fundal height: 22 cm    Physical Exam:    General: Well appearing, no distress  Respiratory: Unlabored breathing  Cardiovascular: Regular rate. Abdomen: Soft, gravid, nontender  Fundal Height: Appropriate for gestational age. Extremities: Warm and well perfused. Non tender.       Kashmir Martinez MD  11/1/2023  2:21 PM

## 2023-10-31 NOTE — ASSESSMENT & PLAN NOTE
Patient with no OB complaints  Up to date on labs and vaccines  Unsure of contraception plan, [email or web address] org info given  Patient will attend [facility] ultrasound tomorrow on 11/2 for complete anatomy scan  Follow-up in clinic in 4 weeks

## 2023-11-01 ENCOUNTER — ROUTINE PRENATAL (OUTPATIENT)
Dept: OBGYN CLINIC | Facility: CLINIC | Age: 28
End: 2023-11-01

## 2023-11-01 VITALS
HEART RATE: 86 BPM | DIASTOLIC BLOOD PRESSURE: 80 MMHG | HEIGHT: 68 IN | SYSTOLIC BLOOD PRESSURE: 117 MMHG | BODY MASS INDEX: 34.31 KG/M2 | WEIGHT: 226.4 LBS

## 2023-11-01 DIAGNOSIS — Z34.92 PRENATAL CARE IN SECOND TRIMESTER: ICD-10-CM

## 2023-11-01 DIAGNOSIS — Z3A.22 22 WEEKS GESTATION OF PREGNANCY: Primary | ICD-10-CM

## 2023-11-01 PROCEDURE — PNV: Performed by: OBSTETRICS & GYNECOLOGY

## 2023-11-01 NOTE — PROGRESS NOTES
Please refer to the [facility] ultrasound report in Ob Procedures for additional information regarding today's visit

## 2023-11-02 ENCOUNTER — ULTRASOUND (OUTPATIENT)
Age: 28
End: 2023-11-02
Payer: COMMERCIAL

## 2023-11-02 VITALS
DIASTOLIC BLOOD PRESSURE: 70 MMHG | WEIGHT: 230.2 LBS | HEIGHT: 68 IN | HEART RATE: 100 BPM | SYSTOLIC BLOOD PRESSURE: 128 MMHG | BODY MASS INDEX: 34.89 KG/M2

## 2023-11-02 DIAGNOSIS — O99.212 MATERNAL OBESITY, ANTEPARTUM, SECOND TRIMESTER: ICD-10-CM

## 2023-11-02 DIAGNOSIS — Z3A.23 23 WEEKS GESTATION OF PREGNANCY: Primary | ICD-10-CM

## 2023-11-02 DIAGNOSIS — Z36.89 ENCOUNTER FOR FETAL ANATOMIC SURVEY: ICD-10-CM

## 2023-11-02 PROCEDURE — 76816 OB US FOLLOW-UP PER FETUS: CPT | Performed by: OBSTETRICS & GYNECOLOGY

## 2023-11-02 NOTE — LETTER
November 2, 2023     Nitish Flores, [address] [address]    Patient: Marta Mcnamara   YOB: 1995   Date of Visit: 11/2/2023       Dear Dr. Kamila Bradshaw:    Thank you for referring Marta Mcnamara to me for evaluation. Below are my notes for this consultation. If you have questions, please do not hesitate to call me. I look forward to following your patient along with you.          Sincerely,        Yesenia Reed MD        CC: No Recipients    Yesenia Reed MD  11/2/2023  2:01 PM  Sign when Signing Visit  Please refer to the [facility] ultrasound report in Ob Procedures for additional information regarding today's visit

## 2023-11-09 DIAGNOSIS — Z3A.13 13 WEEKS GESTATION OF PREGNANCY: ICD-10-CM

## 2023-11-10 RX ORDER — ASPIRIN 81 MG/1
162 TABLET, CHEWABLE ORAL DAILY
Qty: 180 TABLET | Refills: 0 | Status: SHIPPED | OUTPATIENT
Start: 2023-11-10

## 2023-11-17 ENCOUNTER — ROUTINE PRENATAL (OUTPATIENT)
Dept: OBGYN CLINIC | Facility: CLINIC | Age: 28
End: 2023-11-17

## 2023-11-17 ENCOUNTER — APPOINTMENT (OUTPATIENT)
Dept: LAB | Facility: HOSPITAL | Age: 28
End: 2023-11-17
Payer: COMMERCIAL

## 2023-11-17 VITALS
DIASTOLIC BLOOD PRESSURE: 82 MMHG | WEIGHT: 232.4 LBS | HEART RATE: 92 BPM | SYSTOLIC BLOOD PRESSURE: 141 MMHG | HEIGHT: 68 IN | BODY MASS INDEX: 35.22 KG/M2

## 2023-11-17 DIAGNOSIS — O10.919 CHRONIC HYPERTENSION DURING PREGNANCY: ICD-10-CM

## 2023-11-17 DIAGNOSIS — Z34.92 PRENATAL CARE IN SECOND TRIMESTER: Primary | ICD-10-CM

## 2023-11-17 DIAGNOSIS — B37.31 VAGINAL CANDIDIASIS: ICD-10-CM

## 2023-11-17 DIAGNOSIS — Z3A.25 25 WEEKS GESTATION OF PREGNANCY: ICD-10-CM

## 2023-11-17 LAB
ALBUMIN SERPL BCP-MCNC: 3.8 G/DL (ref 3.5–5)
ALP SERPL-CCNC: 56 U/L (ref 34–104)
ALT SERPL W P-5'-P-CCNC: 14 U/L (ref 7–52)
ANION GAP SERPL CALCULATED.3IONS-SCNC: 9 MMOL/L
AST SERPL W P-5'-P-CCNC: 21 U/L (ref 13–39)
BILIRUB SERPL-MCNC: 0.34 MG/DL (ref 0.2–1)
BUN SERPL-MCNC: 10 MG/DL (ref 5–25)
CALCIUM SERPL-MCNC: 9.3 MG/DL (ref 8.4–10.2)
CHLORIDE SERPL-SCNC: 102 MMOL/L (ref 96–108)
CO2 SERPL-SCNC: 23 MMOL/L (ref 21–32)
CREAT SERPL-MCNC: 0.61 MG/DL (ref 0.6–1.3)
CREAT UR-MCNC: 35.8 MG/DL
ERYTHROCYTE [DISTWIDTH] IN BLOOD BY AUTOMATED COUNT: 12.5 % (ref 11.6–15.1)
GFR SERPL CREATININE-BSD FRML MDRD: 125 ML/MIN/1.73SQ M
GLUCOSE P FAST SERPL-MCNC: 92 MG/DL (ref 65–99)
HCT VFR BLD AUTO: 34 % (ref 34.8–46.1)
HGB BLD-MCNC: 11 G/DL (ref 11.5–15.4)
MCH RBC QN AUTO: 31.1 PG (ref 26.8–34.3)
MCHC RBC AUTO-ENTMCNC: 32.4 G/DL (ref 31.4–37.4)
MCV RBC AUTO: 96 FL (ref 82–98)
PLATELET # BLD AUTO: 269 THOUSANDS/UL (ref 149–390)
PMV BLD AUTO: 9.5 FL (ref 8.9–12.7)
POTASSIUM SERPL-SCNC: 3.7 MMOL/L (ref 3.5–5.3)
PROT SERPL-MCNC: 7 G/DL (ref 6.4–8.4)
PROT UR-MCNC: 5 MG/DL
PROT/CREAT UR: 0.14 MG/G{CREAT} (ref 0–0.1)
RBC # BLD AUTO: 3.54 MILLION/UL (ref 3.81–5.12)
SODIUM SERPL-SCNC: 134 MMOL/L (ref 135–147)
WBC # BLD AUTO: 15.24 THOUSAND/UL (ref 4.31–10.16)

## 2023-11-17 PROCEDURE — 80053 COMPREHEN METABOLIC PANEL: CPT

## 2023-11-17 PROCEDURE — 82570 ASSAY OF URINE CREATININE: CPT

## 2023-11-17 PROCEDURE — 36415 COLL VENOUS BLD VENIPUNCTURE: CPT

## 2023-11-17 PROCEDURE — PNV: Performed by: NURSE PRACTITIONER

## 2023-11-17 PROCEDURE — 84156 ASSAY OF PROTEIN URINE: CPT

## 2023-11-17 PROCEDURE — 85027 COMPLETE CBC AUTOMATED: CPT

## 2023-11-17 RX ORDER — FLUCONAZOLE 150 MG/1
150 TABLET ORAL
Qty: 2 TABLET | Refills: 0 | Status: SHIPPED | OUTPATIENT
Start: 2023-11-17 | End: 2023-11-21

## 2023-11-17 NOTE — PATIENT INSTRUCTIONS
Bernadette por bartholomew confianza en nuestro equipo. Ashley Poplin y agradecemos debi comentarios. Si recibe rocky encuesta nuestra, tómese unos momentos para informarnos cómo estamos. DANIELLE Morel       [place]  Llame a Emily Fudge al [phone] para cualquiera de los siguientes:    1. sangrado vaginal  2. Dolor abdominal hawa que no desaparece. 3. Fiebre (más de 100.4 y no se kirill con Tylenol)  4. Vómitos persistentes que pacheco más de 24 horas. 5. dolor de pecho  6. Dolor o ardor al orinar. 7. Dolor de vanessa severo que no se resuelve con Tylenol  8. Visión borrosa o haven puntos en bartholomew visión. 9. Hinchazón repentina de bartholomew maria luz o salina. 10. Enrojecimiento, hinchazón o dolor en rocky pierna. 11. Un aumento de peso repentino en pocos días. 12. Contar los movimientos fetales del bebé. (después de 28 semanas o el sexto mes de embarazo)  15. Rocky pérdida de líquido acuoso de la vagina: puede ser un chorro, un goteo o rocky humedad continua  14.  Después de 20 semanas de embarazo, calambres rítmicos (más de 4 por hora) o menstruales darío dolor Mary Silvina / María Elena Pangburn

## 2023-11-17 NOTE — PROGRESS NOTES
Natalio Duffy presents today for routine OB visit at 25w1d. Blood Pressure: 152/86, repeat 141/82 - new dx of CHTN discussed  Pa=795 kg (232 lb 6.4 oz); Body mass index is 35.34 kg/m².; TWG=13.3 kg (29 lb 6.4 oz)  Fetal Heart Rate: 154; Fundal Height (cm): 28 cm  Abdomen: gravid, soft, non-tender. She reports white vaginal discharge and itching. On exam vaginal candidiasis is noted. Given Rx Diflucan. Denies uterine contractions or persistent cramping. Denies vaginal bleeding or leaking of fluid. Reports fetal movement. Scheduled for ultrasound 1/4/24. Reviewed common discomforts of pregnancy in second trimester and warning signs. Advised to continue medications and return in 4 days for repeat BP check. Current Outpatient Medications   Medication Instructions    aspirin 162 mg, Oral, Daily    fluconazole (DIFLUCAN) 150 mg, Oral, Every 3 days    Prenatal Vit-Fe Fumarate-FA (Prenatal Plus Vitamin/Mineral) 27-1 MG TABS 1 tablet, Oral, Daily       Laboratory workup: initial OB labs (done 7/26/23)    Genetic Screening: NIPS negative, MSAFP negative    Vaccinations: influenza (given 10/4/23); Tdap (will offer after 27 weeks);  COVID-19 (given 8/9/23 & 8/30/21); RSV (will offer b/w 32w0d & 36w6d)    Postpartum contraception: desires Nexplanon    Fetal Ultrasounds:  7/11/23 (7w2d) EDC confirmed  8/30/23 (13w6d) NT WNL, EDENILSON fibroid (5.0 x 6.6 x 6.8cm)  10/18/23 (20w6d) no previa, johnathon WNL w/missed views  11/2/23 (23w0d) johnathon WNL w/missed views, 7cm EDENILSON intramural fibroid      G2 Problems (from 07/11/23 to present)       Problem Noted Resolved    CHTN 11/17/2023 by DANIELLE De Leon No    Overview Addendum 11/17/2023  3:35 PM by DANIELLE De Leon     Based on BP elevations @13 and 25 weeks  Needs baseline PEC labs - ordered 11/17/23  Needs LDASA tx - taking  Serial growth scans  Consider AFS @32 weeks  Needs delivery @38 weeks         Uterine fibroid in pregnancy 8/30/2023 by Heidi Romero Ethan Sanford MD No    Overview Addendum 11/17/2023  3:31 PM by Lena PYLE fibroid (5.0 x 6.6 x 6.8cm)  Serial growth scans - in progress         Obesity affecting pregnancy 8/9/2023 by DANIELLE Daniel No    Overview Addendum 11/17/2023  3:31 PM by DANIELLE Daniel     Pre-pregnant BMI=30.87  Needs early GDM screen - done WNL  Serial growth scans - in progress

## 2023-11-21 ENCOUNTER — ROUTINE PRENATAL (OUTPATIENT)
Dept: OBGYN CLINIC | Facility: CLINIC | Age: 28
End: 2023-11-21

## 2023-11-21 VITALS
SYSTOLIC BLOOD PRESSURE: 135 MMHG | HEART RATE: 90 BPM | DIASTOLIC BLOOD PRESSURE: 76 MMHG | WEIGHT: 232.8 LBS | BODY MASS INDEX: 35.4 KG/M2

## 2023-11-21 DIAGNOSIS — Z3A.25 25 WEEKS GESTATION OF PREGNANCY: ICD-10-CM

## 2023-11-21 DIAGNOSIS — Z34.92 PRENATAL CARE IN SECOND TRIMESTER: Primary | ICD-10-CM

## 2023-11-21 DIAGNOSIS — O10.919 CHRONIC HYPERTENSION DURING PREGNANCY: ICD-10-CM

## 2023-11-21 PROCEDURE — PNV: Performed by: NURSE PRACTITIONER

## 2023-11-21 NOTE — PATIENT INSTRUCTIONS
Bernadette por bartholomew confianza en nuestro equipo. Garrett Sandrine y agradecemos debi comentarios. Si recibe rocky encuesta nuestra, tómese unos momentos para informarnos cómo estamos. Sinceramente,  Rush Shah, CRNP       LA PRE-ECLAMPSIA    ¿Qué es? La preeclampsia es rocky enfermedad grave que puede ocurrir keena el embarazo se relaciona con la presión arterial billy. Le puede pasar a cualquier dylan. ¿Por qué Yes importarme? [place] preeclampsia tienen riesgos graves pueden incluir convulsiones, trazo, daños en los [place], nacimiento prematuro de bartholomew bebé. En los [facility] [place], puede causar la muerte de la madre y bartholomew bebé. ¿Qué irvin pagar Deloise Mantle? Signos y síntomas de la preeclampsia pueden incluir:   * Inflamación severa salina de la maria luz o las   * Dolor de vanessa todavía presente después de shoaib tomado Tylenol   * Hermann manchas o cambios en [place]   * Dolor en la parte superior del abdomen o hombro   * Melvin náuseas y vómitos (en la segunda mitad del embarazo)   * Aumento de peso repentino   * Dificultad para respirar     ¿Qué irvin hacer? Si usted experimenta alguno de los síntomas de la preeclampsia, comuníquese con bartholomew proveedor de [facility]. Encontrar la preeclampsia temprana es importante para usted y bartholomew bebé. Virgene Dakins al [phone].

## 2023-11-21 NOTE — PROGRESS NOTES
Ciera Wall presents today for OB visit at 25w5d for BP check. She was just diagnosed with CHTN 4 days ago. Blood Pressure: 135/76  Qs=311 kg (232 lb 12.8 oz); Body mass index is 35.4 kg/m².; TWG=13.5 kg (29 lb 12.8 oz)  Fetal Heart Rate: 152  Abdomen: gravid, soft, non-tender. She reports no complaints. Denies uterine contractions or persistent cramping. Denies vaginal bleeding or leaking of fluid. Reports fetal movement. Scheduled for ultrasound 1/4/2024. Reviewed common discomforts of pregnancy in second trimester and warning signs. Advised to continue medications and return in 3 weeks.       Current Outpatient Medications   Medication Instructions    aspirin 162 mg, Oral, Daily    fluconazole (DIFLUCAN) 150 mg, Oral, Every 3 days    Prenatal Vit-Fe Fumarate-FA (Prenatal Plus Vitamin/Mineral) 27-1 MG TABS 1 tablet, Oral, Daily         G2 Problems (from 07/11/23 to present)       Problem Noted Resolved    CHTN 11/17/2023 by DANIELLE Ojeda No    Overview Addendum 11/18/2023  8:03 AM by DANIELLE Ojeda     Based on BP elevations @13 and 25 weeks  Needs baseline PEC labs - done WNL  Needs LDASA tx - taking  Serial growth scans  Consider AFS @32 weeks  Needs delivery @38 weeks         Uterine fibroid in pregnancy 8/30/2023 by Karyn Singh MD No    Overview Addendum 11/17/2023  3:31 PM by DANIELLE Ojeda     EDENILSON fibroid (5.0 x 6.6 x 6.8cm)  Serial growth scans - in progress         Obesity affecting pregnancy 8/9/2023 by DANIELLE Ojeda No    Overview Addendum 11/17/2023  3:31 PM by DANIELLE Ojeda     Pre-pregnant BMI=30.87  Needs early GDM screen - done WNL  Serial growth scans - in progress

## 2023-11-27 LAB
COMMENTX: (FRAGILE X): NORMAL
FMR1 GENE CGG RPT BLD/T QL: NORMAL

## 2023-12-08 ENCOUNTER — HOSPITAL ENCOUNTER (EMERGENCY)
Facility: HOSPITAL | Age: 28
Discharge: HOME | End: 2023-12-08
Attending: EMERGENCY MEDICINE
Payer: COMMERCIAL

## 2023-12-08 VITALS
HEART RATE: 90 BPM | TEMPERATURE: 98.4 F | DIASTOLIC BLOOD PRESSURE: 64 MMHG | SYSTOLIC BLOOD PRESSURE: 133 MMHG | WEIGHT: 238.1 LBS | OXYGEN SATURATION: 96 % | BODY MASS INDEX: 36.2 KG/M2 | RESPIRATION RATE: 18 BRPM

## 2023-12-08 DIAGNOSIS — O16.9 HYPERTENSION IN PREGNANCY: Primary | ICD-10-CM

## 2023-12-08 DIAGNOSIS — B34.9 VIRAL SYNDROME: ICD-10-CM

## 2023-12-08 LAB
ALBUMIN SERPL BCP-MCNC: 3.9 G/DL (ref 3.5–5)
ALP SERPL-CCNC: 73 U/L (ref 34–104)
ALT SERPL W P-5'-P-CCNC: 17 U/L (ref 7–52)
ANION GAP SERPL CALCULATED.3IONS-SCNC: 7 MMOL/L
AST SERPL W P-5'-P-CCNC: 20 U/L (ref 13–39)
BACTERIA UR QL AUTO: ABNORMAL /HPF
BASOPHILS # BLD AUTO: 0.02 THOUSANDS/ÂΜL (ref 0–0.1)
BASOPHILS NFR BLD AUTO: 0 % (ref 0–1)
BILIRUB SERPL-MCNC: 0.39 MG/DL (ref 0.2–1)
BILIRUB UR QL STRIP: NEGATIVE
BUN SERPL-MCNC: 9 MG/DL (ref 5–25)
CALCIUM SERPL-MCNC: 10 MG/DL (ref 8.4–10.2)
CHLORIDE SERPL-SCNC: 103 MMOL/L (ref 96–108)
CLARITY UR: CLEAR
CO2 SERPL-SCNC: 25 MMOL/L (ref 21–32)
COLOR UR: YELLOW
CREAT SERPL-MCNC: 0.54 MG/DL (ref 0.6–1.3)
EOSINOPHIL # BLD AUTO: 0.02 THOUSAND/ÂΜL (ref 0–0.61)
EOSINOPHIL NFR BLD AUTO: 0 % (ref 0–6)
ERYTHROCYTE [DISTWIDTH] IN BLOOD BY AUTOMATED COUNT: 12.8 % (ref 11.6–15.1)
FLUAV RNA RESP QL NAA+PROBE: NEGATIVE
FLUBV RNA RESP QL NAA+PROBE: NEGATIVE
GFR SERPL CREATININE-BSD FRML MDRD: 130 ML/MIN/1.73SQ M
GLUCOSE SERPL-MCNC: 94 MG/DL (ref 65–140)
GLUCOSE UR STRIP-MCNC: NEGATIVE MG/DL
HCT VFR BLD AUTO: 37.6 % (ref 34.8–46.1)
HGB BLD-MCNC: 12.1 G/DL (ref 11.5–15.4)
HGB UR QL STRIP.AUTO: NEGATIVE
IMM GRANULOCYTES # BLD AUTO: 0.18 THOUSAND/UL (ref 0–0.2)
IMM GRANULOCYTES NFR BLD AUTO: 1 % (ref 0–2)
KETONES UR STRIP-MCNC: NEGATIVE MG/DL
LEUKOCYTE ESTERASE UR QL STRIP: NEGATIVE
LIPASE SERPL-CCNC: 21 U/L (ref 11–82)
LYMPHOCYTES # BLD AUTO: 1.54 THOUSANDS/ÂΜL (ref 0.6–4.47)
LYMPHOCYTES NFR BLD AUTO: 10 % (ref 14–44)
MCH RBC QN AUTO: 30.8 PG (ref 26.8–34.3)
MCHC RBC AUTO-ENTMCNC: 32.2 G/DL (ref 31.4–37.4)
MCV RBC AUTO: 96 FL (ref 82–98)
MONOCYTES # BLD AUTO: 1.04 THOUSAND/ÂΜL (ref 0.17–1.22)
MONOCYTES NFR BLD AUTO: 7 % (ref 4–12)
NEUTROPHILS # BLD AUTO: 12.15 THOUSANDS/ÂΜL (ref 1.85–7.62)
NEUTS SEG NFR BLD AUTO: 82 % (ref 43–75)
NITRITE UR QL STRIP: NEGATIVE
NON-SQ EPI CELLS URNS QL MICRO: ABNORMAL /HPF
NRBC BLD AUTO-RTO: 0 /100 WBCS
PH UR STRIP.AUTO: 6.5 [PH]
PLATELET # BLD AUTO: 261 THOUSANDS/UL (ref 149–390)
PMV BLD AUTO: 9.2 FL (ref 8.9–12.7)
POTASSIUM SERPL-SCNC: 4.1 MMOL/L (ref 3.5–5.3)
PROT SERPL-MCNC: 7.6 G/DL (ref 6.4–8.4)
PROT UR STRIP-MCNC: ABNORMAL MG/DL
RBC # BLD AUTO: 3.93 MILLION/UL (ref 3.81–5.12)
RBC #/AREA URNS AUTO: ABNORMAL /HPF
RSV RNA RESP QL NAA+PROBE: NEGATIVE
SARS-COV-2 RNA RESP QL NAA+PROBE: NEGATIVE
SODIUM SERPL-SCNC: 135 MMOL/L (ref 135–147)
SP GR UR STRIP.AUTO: 1.01 (ref 1–1.04)
UROBILINOGEN UA: NEGATIVE MG/DL
WBC # BLD AUTO: 14.95 THOUSAND/UL (ref 4.31–10.16)
WBC #/AREA URNS AUTO: ABNORMAL /HPF

## 2023-12-08 PROCEDURE — 0241U HB NFCT DS VIR RESP RNA 4 TRGT: CPT | Performed by: EMERGENCY MEDICINE

## 2023-12-08 PROCEDURE — 83690 ASSAY OF LIPASE: CPT | Performed by: EMERGENCY MEDICINE

## 2023-12-08 PROCEDURE — 96374 THER/PROPH/DIAG INJ IV PUSH: CPT

## 2023-12-08 PROCEDURE — 96375 TX/PRO/DX INJ NEW DRUG ADDON: CPT

## 2023-12-08 PROCEDURE — 36415 COLL VENOUS BLD VENIPUNCTURE: CPT | Performed by: EMERGENCY MEDICINE

## 2023-12-08 PROCEDURE — 81001 URINALYSIS AUTO W/SCOPE: CPT | Performed by: EMERGENCY MEDICINE

## 2023-12-08 PROCEDURE — 80053 COMPREHEN METABOLIC PANEL: CPT | Performed by: EMERGENCY MEDICINE

## 2023-12-08 PROCEDURE — 99284 EMERGENCY DEPT VISIT MOD MDM: CPT

## 2023-12-08 PROCEDURE — 85025 COMPLETE CBC W/AUTO DIFF WBC: CPT | Performed by: EMERGENCY MEDICINE

## 2023-12-08 PROCEDURE — 99284 EMERGENCY DEPT VISIT MOD MDM: CPT | Performed by: EMERGENCY MEDICINE

## 2023-12-08 PROCEDURE — 96361 HYDRATE IV INFUSION ADD-ON: CPT

## 2023-12-08 RX ORDER — DIPHENHYDRAMINE HYDROCHLORIDE 50 MG/ML
25 INJECTION INTRAMUSCULAR; INTRAVENOUS ONCE
Status: COMPLETED | OUTPATIENT
Start: 2023-12-08 | End: 2023-12-08

## 2023-12-08 RX ORDER — METOCLOPRAMIDE HYDROCHLORIDE 5 MG/ML
10 INJECTION INTRAMUSCULAR; INTRAVENOUS ONCE
Status: COMPLETED | OUTPATIENT
Start: 2023-12-08 | End: 2023-12-08

## 2023-12-08 RX ORDER — OSELTAMIVIR PHOSPHATE 75 MG/1
75 CAPSULE ORAL EVERY 12 HOURS SCHEDULED
Qty: 10 CAPSULE | Refills: 0 | Status: SHIPPED | OUTPATIENT
Start: 2023-12-08 | End: 2023-12-13

## 2023-12-08 RX ORDER — ONDANSETRON 2 MG/ML
4 INJECTION INTRAMUSCULAR; INTRAVENOUS ONCE
Status: COMPLETED | OUTPATIENT
Start: 2023-12-08 | End: 2023-12-08

## 2023-12-08 RX ORDER — ACETAMINOPHEN 325 MG/1
975 TABLET ORAL ONCE
Status: COMPLETED | OUTPATIENT
Start: 2023-12-08 | End: 2023-12-08

## 2023-12-08 RX ADMIN — DIPHENHYDRAMINE HYDROCHLORIDE 25 MG: 50 INJECTION, SOLUTION INTRAMUSCULAR; INTRAVENOUS at 09:56

## 2023-12-08 RX ADMIN — ACETAMINOPHEN 975 MG: 325 TABLET ORAL at 09:56

## 2023-12-08 RX ADMIN — ONDANSETRON 4 MG: 2 INJECTION INTRAMUSCULAR; INTRAVENOUS at 09:56

## 2023-12-08 RX ADMIN — METOCLOPRAMIDE 10 MG: 5 INJECTION, SOLUTION INTRAMUSCULAR; INTRAVENOUS at 09:56

## 2023-12-08 RX ADMIN — SODIUM CHLORIDE 1000 ML: 0.9 INJECTION, SOLUTION INTRAVENOUS at 08:56

## 2023-12-08 NOTE — ED PROVIDER NOTES
History  Chief Complaint   Patient presents with    Hypertension - Pregnant     Patient c/o her blood pressure being high at work; approx. 28 weeks pregnant; c/o HA and dizziness     34year old female, 28 week pregnant. HA, dizziness at work moving packages. Had BP checked and told it was high. No chest pain. +HA that was gradual in onset. No meds PTA. Already following with MFM. Prior to Admission Medications   Prescriptions Last Dose Informant Patient Reported? Taking? Prenatal Vit-Fe Fumarate-FA (Prenatal Plus Vitamin/Mineral) 27-1 MG TABS  Self No No   Sig: Take 1 tablet by mouth in the morning   aspirin 81 mg chewable tablet   No No   Sig: Chew 2 tablets (162 mg total) daily      Facility-Administered Medications: None       Past Medical History:   Diagnosis Date    Anxiety     Pancreatitis 2010       Past Surgical History:   Procedure Laterality Date    NO PAST SURGERIES         Family History   Problem Relation Age of Onset    Hypertension Mother     Diabetes Father     Cancer Neg Hx     Ovarian cancer Neg Hx     Colon cancer Neg Hx     Breast cancer Neg Hx      I have reviewed and agree with the history as documented. E-Cigarette/Vaping    E-Cigarette Use Never User      E-Cigarette/Vaping Substances     Social History     Tobacco Use    Smoking status: Never    Smokeless tobacco: Never   Vaping Use    Vaping Use: Never used   Substance Use Topics    Alcohol use: Never    Drug use: Never       Review of Systems   Constitutional: Negative. Negative for chills and fever. HENT: Negative. Negative for rhinorrhea, sore throat, trouble swallowing and voice change. Eyes: Negative. Negative for pain and visual disturbance. Respiratory: Negative. Negative for cough, shortness of breath and wheezing. Cardiovascular: Negative. Negative for chest pain and palpitations. Gastrointestinal:  Negative for abdominal pain, diarrhea, nausea and vomiting. Genitourinary: Negative.   Negative for dysuria and frequency. Musculoskeletal: Negative. Negative for neck pain and neck stiffness. Skin: Negative. Negative for rash. Neurological:  Positive for dizziness and headaches. Negative for speech difficulty, weakness, light-headedness and numbness. Physical Exam  Physical Exam  Vitals and nursing note reviewed. Constitutional:       General: She is not in acute distress. Appearance: She is well-developed. HENT:      Head: Normocephalic and atraumatic. Eyes:      Conjunctiva/sclera: Conjunctivae normal.      Pupils: Pupils are equal, round, and reactive to light. Neck:      Trachea: No tracheal deviation. Cardiovascular:      Rate and Rhythm: Normal rate and regular rhythm. Pulmonary:      Effort: Pulmonary effort is normal. No respiratory distress. Breath sounds: Normal breath sounds. No wheezing or rales. Abdominal:      General: Bowel sounds are normal. There is no distension. Palpations: Abdomen is soft. Tenderness: There is no abdominal tenderness. There is no guarding or rebound. Musculoskeletal:         General: No tenderness or deformity. Normal range of motion. Cervical back: Normal range of motion and neck supple. Skin:     General: Skin is warm and dry. Capillary Refill: Capillary refill takes less than 2 seconds. Findings: No rash. Neurological:      General: No focal deficit present. Mental Status: She is alert and oriented to person, place, and time. GCS: GCS eye subscore is 4. GCS verbal subscore is 5. GCS motor subscore is 6. Cranial Nerves: Cranial nerves 2-12 are intact. Sensory: Sensation is intact. Motor: Motor function is intact. Coordination: Coordination is intact. Gait: Gait is intact.    Psychiatric:         Behavior: Behavior normal.         Vital Signs  ED Triage Vitals [12/08/23 0836]   Temperature Pulse Respirations Blood Pressure SpO2   98.4 °F (36.9 °C) 97 18 153/71 97 %      Temp Source Heart Rate Source Patient Position - Orthostatic VS BP Location FiO2 (%)   Tympanic Monitor Sitting Left arm --      Pain Score       --           Vitals:    12/08/23 0836 12/08/23 1002   BP: 153/71 133/64   Pulse: 97 90   Patient Position - Orthostatic VS: Sitting          Visual Acuity      ED Medications  Medications   sodium chloride 0.9 % bolus 1,000 mL (0 mL Intravenous Stopped 12/8/23 1002)   ondansetron (ZOFRAN) injection 4 mg (4 mg Intravenous Given 12/8/23 0956)   acetaminophen (TYLENOL) tablet 975 mg (975 mg Oral Given 12/8/23 0956)   metoclopramide (REGLAN) injection 10 mg (10 mg Intravenous Given 12/8/23 0956)   diphenhydrAMINE (BENADRYL) injection 25 mg (25 mg Intravenous Given 12/8/23 0956)       Diagnostic Studies  Results Reviewed       Procedure Component Value Units Date/Time    FLU/RSV/COVID - if FLU/RSV clinically relevant [[id number]]  (Normal) Collected: 12/08/23 0956    Lab Status: Final result Specimen: Nares from Nose Updated: 12/08/23 1046     SARS-CoV-2 Negative     INFLUENZA A PCR Negative     INFLUENZA B PCR Negative     RSV PCR Negative    Narrative:      FOR PEDIATRIC PATIENTS - copy/paste COVID Guidelines URL to browser: [email or web address]    SARS-CoV-2 assay is a Nucleic Acid Amplification assay intended for the  qualitative detection of nucleic acid from SARS-CoV-2 in nasopharyngeal  swabs. Results are for the presumptive identification of SARS-CoV-2 RNA. Positive results are indicative of infection with SARS-CoV-2, the virus  causing COVID-19, but do not rule out bacterial infection or co-infection  with other viruses. Laboratories within the [facility] and its  territories are required to report all positive results to the appropriate  public health authorities. Negative results do not preclude SARS-CoV-2  infection and should not be used as the sole basis for treatment or other  patient management decisions. Negative results must be combined with  clinical observations, patient history, and epidemiological information. This test has not been FDA cleared or approved. This test has been authorized by FDA under an Emergency Use Authorization  (EUA). This test is only authorized for the duration of time the  declaration that circumstances exist justifying the authorization of the  emergency use of an in vitro diagnostic tests for detection of SARS-CoV-2  virus and/or diagnosis of COVID-19 infection under section 564(b)(1) of  the Act, 21 U. S.C. 173DVY-5(V)(4), unless the authorization is terminated  or revoked sooner. The test has been validated but independent review by FDA  and CLIA is pending. Test performed using [employer] GeneXpert: This RT-PCR assay targets N2,  a region unique to SARS-CoV-2. A conserved region in the E-gene was chosen  for pan-Sarbecovirus detection which includes SARS-CoV-2. According to CMS-2020-01-R, this platform meets the definition of high-throughput technology.     Urine Microscopic [[id number]]  (Abnormal) Collected: 12/08/23 0905    Lab Status: Final result Specimen: Urine, Other Updated: 12/08/23 0939     RBC, UA 10-20 /hpf      WBC, UA 2-4 /hpf      Epithelial Cells Moderate /hpf      Bacteria, UA Moderate /hpf     Comprehensive metabolic panel [[id number]]  (Abnormal) Collected: 12/08/23 0858    Lab Status: Final result Specimen: Blood from Arm, Left Updated: 12/08/23 0920     Sodium 135 mmol/L      Potassium 4.1 mmol/L      Chloride 103 mmol/L      CO2 25 mmol/L      ANION GAP 7 mmol/L      BUN 9 mg/dL      Creatinine 0.54 mg/dL      Glucose 94 mg/dL      Calcium 10.0 mg/dL      AST 20 U/L      ALT 17 U/L      Alkaline Phosphatase 73 U/L      Total Protein 7.6 g/dL      Albumin 3.9 g/dL      Total Bilirubin 0.39 mg/dL      eGFR 130 ml/min/1.73sq m     Narrative:      [place] guidelines for Chronic Kidney Disease (CKD):     Stage 1 with normal or high GFR (GFR > 90 mL/min/1.73 square meters)    Stage 2 Mild CKD (GFR = 60-89 mL/min/1.73 square meters)    Stage 3A Moderate CKD (GFR = 45-59 mL/min/1.73 square meters)    Stage 3B Moderate CKD (GFR = 30-44 mL/min/1.73 square meters)    Stage 4 Severe CKD (GFR = 15-29 mL/min/1.73 square meters)    Stage 5 End Stage CKD (GFR <15 mL/min/1.73 square meters)  Note: GFR calculation is accurate only with a steady state creatinine    Lipase [[id number]]  (Normal) Collected: 12/08/23 0858    Lab Status: Final result Specimen: Blood from Arm, Left Updated: 12/08/23 0920     Lipase 21 u/L     UA (URINE) with reflex to Scope [[id number]]  (Abnormal) Collected: 12/08/23 0905    Lab Status: Final result Specimen: Urine, Other Updated: 12/08/23 0919     Color, UA Yellow     Clarity, UA Clear     Specific Gravity, UA 1.015     pH, UA 6.5     Leukocytes, UA Negative     Nitrite, UA Negative     Protein, UA 15 (Trace) mg/dl      Glucose, UA Negative mg/dl      Ketones, UA Negative mg/dl      Bilirubin, UA Negative     Occult Blood, UA Negative     UROBILINOGEN UA Negative mg/dL     CBC and differential [[id number]]  (Abnormal) Collected: 12/08/23 0858    Lab Status: Final result Specimen: Blood from Arm, Left Updated: 12/08/23 0904     WBC 14.95 Thousand/uL      RBC 3.93 Million/uL      Hemoglobin 12.1 g/dL      Hematocrit 37.6 %      MCV 96 fL      MCH 30.8 pg      MCHC 32.2 g/dL      RDW 12.8 %      MPV 9.2 fL      Platelets 684 Thousands/uL      nRBC 0 /100 WBCs      Neutrophils Relative 82 %      Immat GRANS % 1 %      Lymphocytes Relative 10 %      Monocytes Relative 7 %      Eosinophils Relative 0 %      Basophils Relative 0 %      Neutrophils Absolute 12.15 Thousands/µL      Immature Grans Absolute 0.18 Thousand/uL      Lymphocytes Absolute 1.54 Thousands/µL      Monocytes Absolute 1.04 Thousand/µL      Eosinophils Absolute 0.02 Thousand/µL      Basophils Absolute 0.02 Thousands/µL                    No orders to display Procedures  Procedures         ED Course                               SBIRT 20yo+      Flowsheet Row Most Recent Value   Initial Alcohol Screen: US AUDIT-C     1. How often do you have a drink containing alcohol? 0 Filed at: 12/08/2023 0837   2. How many drinks containing alcohol do you have on a typical day you are drinking? 0 Filed at: 12/08/2023 0837   3a. Male UNDER 65: How often do you have five or more drinks on one occasion? 0 Filed at: 12/08/2023 0837   3b. FEMALE Any Age, or MALE 65+: How often do you have 4 or more drinks on one occassion? 0 Filed at: 12/08/2023 0837   Audit-C Score 0 Filed at: 12/08/2023 2622   JON: How many times in the past year have you. .. Used an illegal drug or used a prescription medication for non-medical reasons? Never Filed at: 12/08/2023 2340                      Medical Decision Making  Benign neuro exam. Concern Hypertensive urgency, HELLP and pre-eclampsia. Labs okay, no evidence of end organ dysfunction. Symptoms resolved in ED. BP improved. Plan for follow up with PCP and OB. Strict return precautions reviewed. Old charts reviewed, confirm patient established with MFM. Amount and/or Complexity of Data Reviewed  Labs: ordered. Risk  OTC drugs. Prescription drug management. Disposition  Final diagnoses:   Hypertension in pregnancy   Viral syndrome     Time reflects when diagnosis was documented in both MDM as applicable and the Disposition within this note       Time User Action Codes Description Comment    12/8/2023 10:11 AM Andra Salle Add [O16.9] Hypertension in pregnancy     12/8/2023 10:12 AM Andra Salle Add [B34.9] Viral syndrome           ED Disposition       ED Disposition   Discharge    Condition   Stable    Date/Time   Fri Dec 8, 2023 88 Olayinka Saezman discharge to home/self care.                    Follow-up Information       Follow up With Specialties Details Why Contact Info Additional Information [address] [facility] In 1 week  [place], [address] [facility] [address]  [address] [facility], [address] [place], [place], [address] [facility] Ob[facility]etrics and Gynecology Schedule an appointment as soon as possible for a visit   [address]  [address] [facility] [address]  [address] [facility], [address] [place], [place], [address] [phone]            Discharge Medication List as of 12/8/2023 10:12 AM        START taking these medications    Details   oseltamivir (TAMIFLU) 75 mg capsule Take 1 capsule (75 mg total) by mouth every 12 (twelve) hours for 5 days, Starting Fri 12/8/2023, Until Wed 12/13/2023, Normal           CONTINUE these medications which have NOT CHANGED    Details   aspirin 81 mg chewable tablet Chew 2 tablets (162 mg total) daily, Starting Fri 11/10/2023, Normal      Prenatal Vit-Fe Fumarate-FA (Prenatal Plus Vitamin/Mineral) 27-1 MG TABS Take 1 tablet by mouth in the morning, Starting Tue 10/17/2023, Normal             No discharge procedures on file.     PDMP Review       None            ED Provider  Electronically Signed by             Fermin Isidro DO  12/08/23 3367

## 2023-12-12 ENCOUNTER — HOSPITAL ENCOUNTER (OUTPATIENT)
Facility: HOSPITAL | Age: 28
Discharge: HOME | End: 2023-12-12
Attending: STUDENT IN AN ORGANIZED HEALTH CARE EDUCATION/TRAINING PROGRAM | Admitting: STUDENT IN AN ORGANIZED HEALTH CARE EDUCATION/TRAINING PROGRAM
Payer: COMMERCIAL

## 2023-12-12 ENCOUNTER — ROUTINE PRENATAL (OUTPATIENT)
Dept: OBGYN CLINIC | Facility: CLINIC | Age: 28
End: 2023-12-12

## 2023-12-12 VITALS
BODY MASS INDEX: 36.13 KG/M2 | TEMPERATURE: 98.6 F | SYSTOLIC BLOOD PRESSURE: 117 MMHG | DIASTOLIC BLOOD PRESSURE: 58 MMHG | RESPIRATION RATE: 18 BRPM | WEIGHT: 238.38 LBS | HEART RATE: 77 BPM | HEIGHT: 68 IN

## 2023-12-12 VITALS
BODY MASS INDEX: 36.25 KG/M2 | HEART RATE: 103 BPM | WEIGHT: 238.4 LBS | DIASTOLIC BLOOD PRESSURE: 80 MMHG | SYSTOLIC BLOOD PRESSURE: 140 MMHG

## 2023-12-12 DIAGNOSIS — Z3A.28 28 WEEKS GESTATION OF PREGNANCY: ICD-10-CM

## 2023-12-12 DIAGNOSIS — O10.919 CHRONIC HYPERTENSION DURING PREGNANCY: Primary | ICD-10-CM

## 2023-12-12 LAB
ALBUMIN SERPL BCP-MCNC: 3.8 G/DL (ref 3.5–5)
ALP SERPL-CCNC: 68 U/L (ref 34–104)
ALT SERPL W P-5'-P-CCNC: 17 U/L (ref 7–52)
ANION GAP SERPL CALCULATED.3IONS-SCNC: 7 MMOL/L
AST SERPL W P-5'-P-CCNC: 21 U/L (ref 13–39)
BILIRUB SERPL-MCNC: 0.41 MG/DL (ref 0.2–1)
BILIRUB UR QL STRIP: NEGATIVE
BUN SERPL-MCNC: 6 MG/DL (ref 5–25)
CALCIUM SERPL-MCNC: 10.3 MG/DL (ref 8.4–10.2)
CHLORIDE SERPL-SCNC: 104 MMOL/L (ref 96–108)
CLARITY UR: CLEAR
CO2 SERPL-SCNC: 25 MMOL/L (ref 21–32)
COLOR UR: NORMAL
CREAT SERPL-MCNC: 0.48 MG/DL (ref 0.6–1.3)
CREAT UR-MCNC: 36.6 MG/DL
ERYTHROCYTE [DISTWIDTH] IN BLOOD BY AUTOMATED COUNT: 12.9 % (ref 11.6–15.1)
GFR SERPL CREATININE-BSD FRML MDRD: 135 ML/MIN/1.73SQ M
GLUCOSE SERPL-MCNC: 87 MG/DL (ref 65–140)
GLUCOSE UR STRIP-MCNC: NEGATIVE MG/DL
HCT VFR BLD AUTO: 37.1 % (ref 34.8–46.1)
HGB BLD-MCNC: 11.8 G/DL (ref 11.5–15.4)
HGB UR QL STRIP.AUTO: NEGATIVE
KETONES UR STRIP-MCNC: NEGATIVE MG/DL
LEUKOCYTE ESTERASE UR QL STRIP: NEGATIVE
MCH RBC QN AUTO: 30.6 PG (ref 26.8–34.3)
MCHC RBC AUTO-ENTMCNC: 31.8 G/DL (ref 31.4–37.4)
MCV RBC AUTO: 96 FL (ref 82–98)
NITRITE UR QL STRIP: NEGATIVE
PH UR STRIP.AUTO: 7.5 [PH]
PLATELET # BLD AUTO: 262 THOUSANDS/UL (ref 149–390)
PMV BLD AUTO: 9.1 FL (ref 8.9–12.7)
POTASSIUM SERPL-SCNC: 3.8 MMOL/L (ref 3.5–5.3)
PROT SERPL-MCNC: 7.3 G/DL (ref 6.4–8.4)
PROT UR STRIP-MCNC: NEGATIVE MG/DL
PROT UR-MCNC: 6 MG/DL
PROT/CREAT UR: 0.16 MG/G{CREAT} (ref 0–0.1)
RBC # BLD AUTO: 3.86 MILLION/UL (ref 3.81–5.12)
SODIUM SERPL-SCNC: 136 MMOL/L (ref 135–147)
SP GR UR STRIP.AUTO: 1.01 (ref 1–1.03)
TREPONEMA PALLIDUM IGG+IGM AB [PRESENCE] IN SERUM OR PLASMA BY IMMUNOASSAY: NORMAL
UROBILINOGEN UR STRIP-ACNC: <2 MG/DL
WBC # BLD AUTO: 16.05 THOUSAND/UL (ref 4.31–10.16)

## 2023-12-12 PROCEDURE — NC001 PR NO CHARGE: Performed by: OBSTETRICS & GYNECOLOGY

## 2023-12-12 PROCEDURE — 99213 OFFICE O/P EST LOW 20 MIN: CPT

## 2023-12-12 PROCEDURE — PNV: Performed by: OBSTETRICS & GYNECOLOGY

## 2023-12-12 PROCEDURE — 76817 TRANSVAGINAL US OBSTETRIC: CPT

## 2023-12-12 PROCEDURE — 85027 COMPLETE CBC AUTOMATED: CPT

## 2023-12-12 PROCEDURE — 80053 COMPREHEN METABOLIC PANEL: CPT

## 2023-12-12 PROCEDURE — 86780 TREPONEMA PALLIDUM: CPT

## 2023-12-12 PROCEDURE — 84156 ASSAY OF PROTEIN URINE: CPT

## 2023-12-12 PROCEDURE — 76817 TRANSVAGINAL US OBSTETRIC: CPT | Performed by: OBSTETRICS & GYNECOLOGY

## 2023-12-12 PROCEDURE — G0463 HOSPITAL OUTPT CLINIC VISIT: HCPCS

## 2023-12-12 PROCEDURE — 81003 URINALYSIS AUTO W/O SCOPE: CPT

## 2023-12-12 PROCEDURE — 82570 ASSAY OF URINE CREATININE: CPT

## 2023-12-12 RX ADMIN — SODIUM CHLORIDE, SODIUM LACTATE, POTASSIUM CHLORIDE, AND CALCIUM CHLORIDE 1000 ML: .6; .31; .03; .02 INJECTION, SOLUTION INTRAVENOUS at 12:20

## 2023-12-12 NOTE — PROGRESS NOTES
[address] [facility]  [id number]  1995        ASSESSMENT/PLAN:  Problem List Items Addressed This Visit       28 weeks gestation of pregnancy - Primary     Overview:  Labs  Pap smear 2023, NILM   HIV, RPR, GC/CT, Hep C: negative  Rubella: immune  Hep B: immune  NIPT/MSAFP screen negative   28w labs ordered   GBS around 36w   Vaccines:  Flu vaccine: given  Tdap vaccine: not given, need to address next visit   Delivery consent: not signed today- discussed primarily [address] [facility] [address] and need for eval. Needs consent signed at upcoming visit along with TDAP                   [employer]  [id number] used for interpretation    Subjective: 34 y.o. Fela Gómez 28w5d here for prenatal visit. She denies contractions. She denies leakage of fluid and vaginal bleeding. She endorses good fetal movement. She was seen in the [address] [place] [address] ED on  after noting headache, dizziness and generally not feeling well while at work. Her blood pressure was noted to be high at work and at the ED. She had pree work up completed with normal labs and was sent home. She continues to report these kinds of symptoms more frequently. She is very concerned about how she feels. She takes her blood pressure at home sometimes. She was able to give me 2 blood pressures from home: 131/81, 124/93. She does not have any other recorded Bps. She is a diagnosed chronic htn. Today she denies any dizziness, headache, scotoma, chest pain, shortness of breath or RUQ pain. She is very concerned about her blood pressures.      Objective:  Pre-Becky Vitals      Flowsheet Row Most Recent Value   Prenatal Assessment    Fetal Heart Rate 155   Fundal Height (cm) 28 cm   Movement Present   Prenatal Vitals    Blood Pressure 140/80   Weight - Scale 108 kg (238 lb 6.4 oz)   Urine Albumin/Glucose    Dilation/Effacement/Station    Vaginal Drainage    Edema                Pregnancy Plan:  Pregnancy: Sheryl Fischer  Fetal sex: Female     Delivery Plans  Deliver by GA (weeks): 39  Planned delivery method: Vaginal  Planned delivery location: [facility] L&D  Planned anesthesia: Epidural  Acceptable blood products: All     Post-Delivery Plans  Feeding intentions: Breast Milk and Non-human milk substitute  Planned birth control: Implant      General: Well appearing, no distress  Respiratory: Unlabored breathing  Abdomen: Soft, gravid, nontender  Fundal Height: Appropriate for gestational age. Extremities: Warm and well perfused. Non tender.         D/w Dr. Cristy Segal MD  OBGYN PGY-4  9:24 AM  12/12/2023

## 2023-12-12 NOTE — LETTER
[address] [facility] [address]  Dept: [phone]    December 12, 2023     Patient: Helder Babcock   YOB: 1995   Date of Visit: 12/12/2023       To Whom it May Concern:    Helder Babcock is under my professional care. She was seen in the hospital from 12/12/2023 to 12/12/23. She may return to work on 12/13/2023 without limitations. If you have any questions or concerns, please don't hesitate to call.          Sincerely,          Salazar Mejía MD

## 2023-12-12 NOTE — QUICK NOTE
Subjective:  Evaluated patient at bedside. Patient sent from prenatal visit due to elevated blood pressure in the office and presence of a mild headache. On arrival here patient has been normotensive. Patient denies a headache currently. She said in the office she had a very mild 1/10 headache that is not currently present. She denies headache, vision changes, chest pain, shortness of breath, upper abdominal pain. She has noticed some more swelling in her hands in the last 2 days. She had dizziness that brought her to the emergency department on Friday of last week but this has resolved and is better with Reglan. She states she has not been urinating as much. She denies dysuria, hematuria. She states she works at [employer] which requires a lot of walking and is very stressful. Objective: On exam, she is in no respiratory distress. Pulse is regular. Abdomen is gravid, but nondistended and nontender. She has no lower extremity edema. She has no CVA tenderness. Assessment and plan:  Yue Morales is a 61-year-old -0-1-0 at 28w5d sent from prenatal visit for rule out preeclampsia. She is normotensive and asymptomatic here.    - CBC, CMP, protein creatinine ratio, UA  - Continue to monitor blood pressure  - Schedule induction for 37 weeks prior to discharge home  -Insert IV and start IV hydration for intermittent fetal tachycardia  -SVE deferred at this time, no concerns for  labor at this time

## 2023-12-12 NOTE — PROGRESS NOTES
L&D Triage Note - OB/GYN  Adriana Meza 34 y.o. female MRN: [id number]  Unit/Bed#: LD TRIAGE  Encounter: [id number]        Patient is seen by [facility]    ASSESSMENT/PLAN  Lenny Sanchez is a 34 y.o. Jerrald Brill at 28w5d here for evaluation after an elevated blood pressure at her prenatal appointment. Patient has chronic hypertension but was normotensive in triage. Not requiring medication at this time. Pre-E labs wnl. Patient observed due to one late deceleration and regular contractions on toco. Patient not feeling contractions. FHT with normal baseline, moderate variability, and no further decelerations. Cervical exam closed thick and high at initial check and unchanged after recheck. Patient is stable for discharge home. Discussed  labor signs, s/s of pre-eclampsia, and return precautions.        1) Rule out Pre-eclampsia   - CBC, CMP wnl; P;C 0.16    2) Rule out  labor  - Speculum exam: normal external genitalia without lesion, normal appearing vagina and cervix without lesion, mild amount of physiologic discharge, no bleeding     - UA unremarkable     KOH/Wet [place]:     Infection:   - neg clue cells    - neg hyphae   - neg trichomonads present    Membrane status   - neg ferning   - neg nitrazene   - neg pooling     SVE:  Cervical Dilation: Closed  Cervical Effacement: 0  Cervical Consistency: Firm  Fetal Station: Ballotable  Presentation: Vertex  Position: Unknown  Method: Manual  OB Examiner: antonino    FHT:  Baseline Rate (FHR): 150 bpm  Variability: Moderate 6-25 BPM  Accelerations: 15 x 15 or greater  FHR Category: Category I    TOCO:   Contraction Frequency (minutes): 4  Contraction Duration (seconds): 30-90    IMAGING:       TVUS   Cervical length         - 3.41cm         - 3.44cm         - 4.01cm   Presentation: cephalic    3)  Discharge instructions  - Patient instructed to call if experiencing worsening contractions, vaginal bleeding, loss of fluid or decreased fetal movement. - Will follow up with OBGYN in office    D/w Dr. Angeli Patten   ______________    SUBJECTIVE    CATALINO: Estimated Date of Delivery: 24    HPI:  34 y.o.  28w5d presents for evaluation after an elevated BP at her prenatal visit. She denies: headache, chest pain, SOB, upper abdominal pain. She had a mild 1/10 headache at her prenatal appointment that has since resolved. See 11:07am quick note for full HPI. Contractions: irregular, non-painful  Leakage of fluid: denies  Vaginal Bleeding: denies  Fetal movement: present    Her obstetrical history is significant for SAB . She has cHTN this pregnancy, not currently on medication with normal baseline  Pre-E labs. She also has uterine fibroids and obesity. ROS:  Constitutional: Negative  Respiratory: Negative  Cardiovascular: Negative    Gastrointestinal: Negative    Physical Exam  GEN: Well appearing, no apparent distress   ABD: Gravid, soft, non-tender   SVE: Cervical Dilation: Closed  Cervical Effacement: 0  Cervical Consistency: Firm  Fetal Station: Ballotable  Presentation: Vertex  Position: Unknown  Method: Manual  OB Examiner: antonino    OBJECTIVE:  /58   Pulse 77   Temp 98.6 °F (37 °C) (Oral)   Resp 18   Ht 5' 8" (1.727 m)   Wt 108 kg (238 lb 6.1 oz)   LMP 2023 (Exact Date)   BMI 36.25 kg/m²   Body mass index is 36.25 kg/m².   Labs:   Recent Results (from the past 24 hour(s))   CBC and Platelet    Collection Time: 23 10:51 AM   Result Value Ref Range    WBC 16.05 (H) 4.31 - 10.16 Thousand/uL    RBC 3.86 3.81 - 5.12 Million/uL    Hemoglobin 11.8 11.5 - 15.4 g/dL    Hematocrit 37.1 34.8 - 46.1 %    MCV 96 82 - 98 fL    MCH 30.6 26.8 - 34.3 pg    MCHC 31.8 31.4 - 37.4 g/dL    RDW 12.9 11.6 - 15.1 %    Platelets 176 852 - 076 Thousands/uL    MPV 9.1 8.9 - 12.7 fL   Comprehensive metabolic panel    Collection Time: 23 10:51 AM   Result Value Ref Range    Sodium 136 135 - 147 mmol/L Potassium 3.8 3.5 - 5.3 mmol/L    Chloride 104 96 - 108 mmol/L    CO2 25 21 - 32 mmol/L    ANION GAP 7 mmol/L    BUN 6 5 - 25 mg/dL    Creatinine 0.48 (L) 0.60 - 1.30 mg/dL    Glucose 87 65 - 140 mg/dL    Calcium 10.3 (H) 8.4 - 10.2 mg/dL    AST 21 13 - 39 U/L    ALT 17 7 - 52 U/L    Alkaline Phosphatase 68 34 - 104 U/L    Total Protein 7.3 6.4 - 8.4 g/dL    Albumin 3.8 3.5 - 5.0 g/dL    Total Bilirubin 0.41 0.20 - 1.00 mg/dL    eGFR 135 ml/min/1.73sq m   Protein / creatinine ratio, urine    Collection Time: 12/12/23 10:51 AM   Result Value Ref Range    Creatinine, Ur 36.6 Reference range not established. mg/dL    Protein Urine Random 6 Reference range not established. mg/dL    Prot/Creat Ratio, Ur 0.16 (H) 0.00 - 0.10   UA (URINE) with reflex to Scope    Collection Time: 12/12/23 10:51 AM   Result Value Ref Range    Color, UA Light Yellow     Clarity, UA Clear     Specific Gravity, UA 1.007 1.003 - 1.030    pH, UA 7.5 4.5, 5.0, 5.5, 6.0, 6.5, 7.0, 7.5, 8.0    Leukocytes, UA Negative Negative    Nitrite, UA Negative Negative    Protein, UA Negative Negative mg/dl    Glucose, UA Negative Negative mg/dl    Ketones, UA Negative Negative mg/dl    Urobilinogen, UA <2.0 <2.0 mg/dl mg/dl    Bilirubin, UA Negative Negative    Occult Blood, UA Negative Negative   RPR-Syphilis Screening (Total Syphilis IGG/IGM)    Collection Time: 12/12/23 12:22 PM   Result Value Ref Range    Syphilis Total Antibody Non-reactive Non-Reactive         Baldomero Smith MD  OB/GYN PGY-1  12/12/2023  6:30 PM      Portions of the record may have been created with voice recognition software. Occasional wrong word or "sound a like" substitutions may have occurred due to the inherent limitations of voice recognition software.   Read the chart carefully and recognize, using context, where substitutions have occurred

## 2023-12-12 NOTE — ASSESSMENT & PLAN NOTE
LUCIANO diagnosed- went over Bps from prior to pregnancy and early pregnancy and elevated readings noted. She does not have symptoms today of preeclampsia however, given her worsening report of symptoms at home, feel that an evaluation at L&D is warranted with labs and blood pressure monitoring. She will also need to have another growth scan at some point. [facility] is going [facility] secondary to [address] The team has been notified.
Overview:  Labs  Pap smear 08/09/2023, NILM   HIV, RPR, GC/CT, Hep C: negative  Rubella: immune  Hep B: immune  NIPT/MSAFP screen negative   28w labs ordered   GBS around 36w   Vaccines:  Flu vaccine: given  Tdap vaccine: not given, need to address next visit   Delivery consent: not signed today- discussed primarily [address] [facility] and need for eval. Needs consent signed at upcoming visit along with TDAP
baseline

## 2023-12-13 NOTE — PROCEDURES
Awa Schaefer, claudia  at 28w5d with an CATALINO of 2024, by Last Menstrual Period, was seen at [address] [place] [address] for the following procedure(s): $Procedure Type: US - Transvaginal]      Ultrasound Other  Fetal Presentation: Vertex  Cervical Length: 3.41  Funnel: No  Debris: No  Placenta Previa: No  Vasa Previa: No        Ultrasound Probe Disinfection    A transvaginal ultrasound was performed.    Prior to use, disinfection was performed with High Level Disinfection Process (Trophon)  Probe serial number [id number]: [id number] was used    Zelda Arita MD  23  7:08 PM

## 2023-12-14 ENCOUNTER — APPOINTMENT (OUTPATIENT)
Dept: LAB | Facility: HOSPITAL | Age: 28
End: 2023-12-14
Payer: COMMERCIAL

## 2023-12-14 ENCOUNTER — ULTRASOUND (OUTPATIENT)
Dept: PERINATAL CARE | Facility: CLINIC | Age: 28
End: 2023-12-14
Payer: COMMERCIAL

## 2023-12-14 VITALS
WEIGHT: 238.6 LBS | SYSTOLIC BLOOD PRESSURE: 130 MMHG | DIASTOLIC BLOOD PRESSURE: 65 MMHG | HEIGHT: 68 IN | BODY MASS INDEX: 36.16 KG/M2 | HEART RATE: 89 BPM

## 2023-12-14 DIAGNOSIS — Z3A.28 28 WEEKS GESTATION OF PREGNANCY: ICD-10-CM

## 2023-12-14 DIAGNOSIS — Z36.89 ENCOUNTER FOR ULTRASOUND TO CHECK FETAL GROWTH: ICD-10-CM

## 2023-12-14 DIAGNOSIS — Z36.2 ENCOUNTER FOR FOLLOW-UP ULTRASOUND OF FETAL ANATOMY: ICD-10-CM

## 2023-12-14 DIAGNOSIS — O34.10 UTERINE FIBROID IN PREGNANCY: ICD-10-CM

## 2023-12-14 DIAGNOSIS — O99.212 OTHER OBESITY AFFECTING PREGNANCY IN SECOND TRIMESTER: ICD-10-CM

## 2023-12-14 DIAGNOSIS — O20.0 THREATENED MISCARRIAGE IN EARLY PREGNANCY: ICD-10-CM

## 2023-12-14 DIAGNOSIS — O10.919 CHRONIC HYPERTENSION DURING PREGNANCY: Primary | ICD-10-CM

## 2023-12-14 DIAGNOSIS — O36.63X0 EXCESSIVE FETAL GROWTH AFFECTING MANAGEMENT OF PREGNANCY IN THIRD TRIMESTER, SINGLE OR UNSPECIFIED FETUS: ICD-10-CM

## 2023-12-14 DIAGNOSIS — D25.9 UTERINE FIBROID IN PREGNANCY: ICD-10-CM

## 2023-12-14 DIAGNOSIS — Z3A.29 29 WEEKS GESTATION OF PREGNANCY: ICD-10-CM

## 2023-12-14 DIAGNOSIS — E66.8 OTHER OBESITY AFFECTING PREGNANCY IN SECOND TRIMESTER: ICD-10-CM

## 2023-12-14 LAB
B-HCG SERPL-ACNC: ABNORMAL MIU/ML (ref 0–5)
ERYTHROCYTE [DISTWIDTH] IN BLOOD BY AUTOMATED COUNT: 12.9 % (ref 11.6–15.1)
GLUCOSE 1H P 50 G GLC PO SERPL-MCNC: 122 MG/DL (ref 40–134)
HCT VFR BLD AUTO: 37.7 % (ref 34.8–46.1)
HGB BLD-MCNC: 11.8 G/DL (ref 11.5–15.4)
MCH RBC QN AUTO: 30.1 PG (ref 26.8–34.3)
MCHC RBC AUTO-ENTMCNC: 31.3 G/DL (ref 31.4–37.4)
MCV RBC AUTO: 96 FL (ref 82–98)
PLATELET # BLD AUTO: 272 THOUSANDS/UL (ref 149–390)
PMV BLD AUTO: 9.4 FL (ref 8.9–12.7)
RBC # BLD AUTO: 3.92 MILLION/UL (ref 3.81–5.12)
TREPONEMA PALLIDUM IGG+IGM AB [PRESENCE] IN SERUM OR PLASMA BY IMMUNOASSAY: NORMAL
WBC # BLD AUTO: 13.8 THOUSAND/UL (ref 4.31–10.16)

## 2023-12-14 PROCEDURE — 85027 COMPLETE CBC AUTOMATED: CPT

## 2023-12-14 PROCEDURE — 84702 CHORIONIC GONADOTROPIN TEST: CPT

## 2023-12-14 PROCEDURE — 86780 TREPONEMA PALLIDUM: CPT

## 2023-12-14 PROCEDURE — 76816 OB US FOLLOW-UP PER FETUS: CPT | Performed by: STUDENT IN AN ORGANIZED HEALTH CARE EDUCATION/TRAINING PROGRAM

## 2023-12-14 PROCEDURE — 36415 COLL VENOUS BLD VENIPUNCTURE: CPT

## 2023-12-14 PROCEDURE — 99213 OFFICE O/P EST LOW 20 MIN: CPT | Performed by: STUDENT IN AN ORGANIZED HEALTH CARE EDUCATION/TRAINING PROGRAM

## 2023-12-14 PROCEDURE — 82950 GLUCOSE TEST: CPT

## 2023-12-14 NOTE — PROGRESS NOTES
[address]  [facility] [place]: Ms. Jeffrey Pandya was seen today for fetal growth and followup missed anatomy ultrasound. See ultrasound report under "OB Procedures" tab. MDM:   I. Diagnoses/Problems addressed:  Suspected macrosomia  II. Data: I reviewed 4 lab tests ordered by another provider. III. Risk of morbidity: Low    Please don't hesitate to contact our office with any concerns or questions.   -Josee Cooper MD

## 2023-12-15 ENCOUNTER — HOSPITAL ENCOUNTER (OUTPATIENT)
Facility: HOSPITAL | Age: 28
Discharge: HOME | End: 2023-12-15
Attending: OBSTETRICS & GYNECOLOGY | Admitting: OBSTETRICS & GYNECOLOGY
Payer: COMMERCIAL

## 2023-12-15 ENCOUNTER — ROUTINE PRENATAL (OUTPATIENT)
Dept: OBGYN CLINIC | Facility: CLINIC | Age: 28
End: 2023-12-15

## 2023-12-15 VITALS
TEMPERATURE: 98.8 F | RESPIRATION RATE: 18 BRPM | HEART RATE: 92 BPM | DIASTOLIC BLOOD PRESSURE: 68 MMHG | SYSTOLIC BLOOD PRESSURE: 132 MMHG

## 2023-12-15 VITALS
BODY MASS INDEX: 35.85 KG/M2 | HEART RATE: 99 BPM | DIASTOLIC BLOOD PRESSURE: 60 MMHG | WEIGHT: 235.8 LBS | SYSTOLIC BLOOD PRESSURE: 132 MMHG

## 2023-12-15 DIAGNOSIS — Z34.93 PRENATAL CARE IN THIRD TRIMESTER: Primary | ICD-10-CM

## 2023-12-15 DIAGNOSIS — O28.8 NST (NON-STRESS TEST) NONREACTIVE: ICD-10-CM

## 2023-12-15 DIAGNOSIS — Z3A.29 29 WEEKS GESTATION OF PREGNANCY: ICD-10-CM

## 2023-12-15 PROCEDURE — 99213 OFFICE O/P EST LOW 20 MIN: CPT | Performed by: OBSTETRICS & GYNECOLOGY

## 2023-12-15 PROCEDURE — PNV: Performed by: NURSE PRACTITIONER

## 2023-12-15 PROCEDURE — 99202 OFFICE O/P NEW SF 15 MIN: CPT

## 2023-12-15 PROCEDURE — G0463 HOSPITAL OUTPT CLINIC VISIT: HCPCS

## 2023-12-15 PROCEDURE — 59025 FETAL NON-STRESS TEST: CPT | Performed by: OBSTETRICS & GYNECOLOGY

## 2023-12-15 PROCEDURE — 59025 FETAL NON-STRESS TEST: CPT | Performed by: NURSE PRACTITIONER

## 2023-12-15 NOTE — PROGRESS NOTES
L&D Triage Note - OB/GYN  Adriana CortorrealTorres 34 y.o. female MRN: [id number]  Unit/Bed#: L&D 326-01 Encounter: [id number]      ASSESSMENT:    Jay Polanco is a 34 y.o.  at 28w2d w/ CHTN presenting to triage for EFM after 8 minute deceleration on NST in the office. PLAN:    1) EFM  -NST reactive when placed on monitor  -Denies obstetric complaints  -Plan for 4 hours of EFM   - Case discussed with Dr. Jorge Osman:    Jay Polanco 34 y.o. Brittany Kirkpatrick at 29w1d with an Estimated Date of Delivery: 24 presenting to triage by EMS after 8 minute deceleration was noted on NST in the office. The patient was receiving an NST for concern after deceleration on NST was noted on 23 in triage. She has felt good fetal movement today. She denies vaginal bleeding, leakage of fluid.      Her current obstetrical history is significant for CHTN, BMI 35, large for gestational age    Her past obstetrical history is significant for 1 SAB    OBJECTIVE:    Vitals:    12/15/23 1237   BP: 132/68   Pulse: 92   Resp: 18   Temp: 98.8 °F (37.1 °C)       ROS:  Constitutional: Negative  Respiratory: Negative  Cardiovascular: Negative    Gastrointestinal: Negative    General Physical Exam:  General: in no apparent distress  Cardiovascular: No murmurs  Lungs: non-labored breathing  Abdomen: abdomen is soft without significant tenderness, masses, organomegaly or guarding  Lower extremeties: nontender    FHT:   130 bpm, moderate variability, 15x15 accelerations, decelerations absent    TOCO:    No contractions present    Lab Results   Component Value Date    WBC 13.80 (H) 2023    HGB 11.8 2023    HCT 37.7 2023     2023     Lab Results   Component Value Date    K 3.8 2023     2023    CO2 25 2023    BUN 6 2023    CREATININE 0.48 (L) 2023    AST 21 2023    ALT 17 2023       Ron Pedroza MD,  OBGYN PGY-2  12/15/2023 2:11 PM

## 2023-12-15 NOTE — PATIENT INSTRUCTIONS
Bernadette por bartholomew confianza en nuestro equipo. Norrine Argue y agradecemos debi comentarios. Si recibe rocky encuesta nuestra, tómese unos momentos para informarnos cómo estamos.    Sinceramente,  [address] Trish Butler

## 2023-12-15 NOTE — PROGRESS NOTES
Zunilda Henry presents today for OB visit at 29w1d for add-on NST due to  contractions (without cervical change) noted during hospital visit on 2023. Blood Pressure: 132/60  Zk=941 kg (235 lb 12.8 oz); Body mass index is 35.85 kg/m².; TWG=14.9 kg (32 lb 12.8 oz)  Fetal Heart Rate: 145  NST initially reactive but then spontaneous prolonged deceleration to 80's x8 minutes followed by spontaneous resolution of deceleration and fetal tachycardia x10 minutes. After maternal position change, FHR recovered to reactive status x30 minutes of monitoring. Abdomen: gravid, soft, non-tender. Reports uterine contractions. Denies vaginal bleeding or leaking of fluid. Reports adequate fetal movement of at least 10 movements in 2 hours once daily. After discussion and EFM strip review with Dr. Jonel Cutler it was decided that patient should have extended monitoring at the hospital and be transported there via EMS.    was contacted and patient transported to [address] [place] [address].

## 2023-12-21 ENCOUNTER — TELEPHONE (OUTPATIENT)
Dept: OBGYN CLINIC | Facility: CLINIC | Age: 28
End: 2023-12-21

## 2023-12-26 ENCOUNTER — ROUTINE PRENATAL (OUTPATIENT)
Dept: OBGYN CLINIC | Facility: CLINIC | Age: 28
End: 2023-12-26

## 2023-12-26 VITALS
DIASTOLIC BLOOD PRESSURE: 84 MMHG | HEART RATE: 102 BPM | BODY MASS INDEX: 37.04 KG/M2 | SYSTOLIC BLOOD PRESSURE: 136 MMHG | WEIGHT: 244.4 LBS | HEIGHT: 68 IN

## 2023-12-26 DIAGNOSIS — Z34.93 PRENATAL CARE IN THIRD TRIMESTER: Primary | ICD-10-CM

## 2023-12-26 DIAGNOSIS — Z3A.30 30 WEEKS GESTATION OF PREGNANCY: ICD-10-CM

## 2023-12-26 PROCEDURE — 90715 TDAP VACCINE 7 YRS/> IM: CPT | Performed by: NURSE PRACTITIONER

## 2023-12-26 PROCEDURE — PNV: Performed by: NURSE PRACTITIONER

## 2023-12-26 PROCEDURE — 90471 IMMUNIZATION ADMIN: CPT | Performed by: NURSE PRACTITIONER

## 2023-12-26 NOTE — PATIENT INSTRUCTIONS
Bernadette por bartholomew confianza en nuestro equipo.   Le agradecemos y agradecemos debi comentarios.   Si recibe rocky encuesta nuestra, tómese unos momentos para informarnos cómo estamos.   Sinceramente,  CAM RosenthalNP       El Tercer Trimestre  (28-42 semanas)   BARTHOLOMEW BEBÉ   ·        bartholomew bebé chupa bartholomew dedo ahora!   ·        bartholomew bebé puede oír voces y responder al tacto... habla con él o cliff!!   ·        el cerebro de bartholomew bebé crece y se desarrolla más en los últimos 2 meses de embarazo   ·        vanessa y huesos del bebé son suaves y flexibles para que quepan por el canal del parto   ·        los movimientos del bebé cambian hacia el final del embarazo porque hay menos espacio para patear y estiramientos en tu vientre   ·        los pulmones del bebé no están completamente desarrollados y totalmente preparados para respirar por debi propios hasta el último 3-4 semanas antes de bartholomew fecha de vencimiento     TU CUERPO   ·        bartholomew vientre está creciendo mucho ahora   ·        será más difícil dormir ady de noche o ser tan activos darío eres generalmente   ·        puede sudar más de lo habitual   ·        serás más desequilibrado... tenga cuidado de no caer!   ·        Usted puede desarrollar hemorroides (qué pueden ser dolorosas y hacen difícil sentarse)   ·        los dos últimos meses del embarazo puede ser muy incómodos con ruby de espalda, ruby de vanessa y ardor de estómago   ·        Puedes empezar a tener contracciones... mientras son irregulares y menos de 5 por hora, esto es rocky parte normal de bartholomew cuerpo a punto de tener un bebé   ·        el ave uterino puede empezar a dilatar y abrir arriba... para estar listo para el parto   ·        Usted puede encontrarse que necesitan hacer orinar muy a menudo... porque el bebé está presionando mucho la vejiga   ·        Usted puede quedarse corta de respiracion más rápido de lo pura          CUENTAS DEL RETROCESO FETAL    En el tercer trimestre (después de 28  semanas de gestación) deben realizar patada fetal cuentas cada día. Obrien bebé debe moverse al menos 10 veces en 2 horas keena un tiempo activo, rocky vez al día.    Elegir el atime del día cuando el bebé es más activo. Trate de hacerlo a la misma hora cada día. Entrar en rocky posición cómoda y luego escribir el tiempo que tu bebé se mueve jam. Cuenta cada movimiento hasta que el bebé se mueve 10 veces. Estos movimientos incluyen patadas, puñetazos, codazos, revolotea o rollos. [address] puede tardar entre 5 minutos a 2 horas. Anote el tiempo que sientes movimiento 10 del bebé.    Si ha pasado 2 horas y tu bebé no se ha movido al menos 10 veces, usted debe llamar a la oficina de inmediato. [phone]          UMER DE PARTO PREMATURO     Cuando llamar a [phone]:  * Tengo que llamar inmediatamente si tengo incluso rocky pequeña cantidad de LIQUIDO de mi vagina, con o sin contracciones.   * Tengo que llamar si estoy SANGRADO de mi vagina.   * Tengo que llamar si tengo COLICOS que continúa después de beber 2 ó 3 vasos de agua y acostados en mi lado keena rocky hora y que se siente darío que estoy teniendo un período.   * Tengo que llamar si siento CONTRACCIONES más de 4 veces en rocky hora quando siento que mi elise se mantiene dura después de que trato de beber 2-3 vasos del agua y acostarme en mi lado keena rocky hora.   * Tengo que llamar si tyler un cambio de mi FLUJO vaginal.   * Tengo que llamar si siento la [place] que siente que el bebé aprieta en mi vagina y dura más de rocky hora.   * Tengo que llamar si tengo DOLOR BAJO DE LA ESPALDA que es nueva y está cerca de mi cóccix. Puede entrar y salir varias veces keena rocky hora o quedarse allí constantemente.         LA PRE-ECLAMPSIA    ¿Qué es? La preeclampsia es rocky enfermedad grave que puede ocurrir keena el embarazo se relaciona con la presión arterial billy. Le puede pasar a cualquier dylan.     ¿Por qué Yes importarme? Las mujeres Care preeclampsia  tienen riesgos graves pueden incluir convulsiones, trazo, daños en los órganos, nacimiento prematuro de bartholomew bebé. En los peor de los casos, puede causar la muerte de la madre y bartholomew bebé.     ¿Qué irvin pagar atención rocky? Signos y síntomas de la preeclampsia pueden incluir:   * Inflamación severa salina de la maria luz o las   * Dolor de vanessa todavía presente después de shoaib tomado Tylenol   * Hermann manchas o cambios en Lav[place]   * Dolor en la parte superior del abdomen o hombro   * [place] náuseas y vómitos (en la segunda mitad del embarazo)   * Aumento de peso repentino   * Dificultad para respirar     ¿Qué irvin hacer? Si usted experimenta alguno de los síntomas de la preeclampsia, comuníquese con bartholomew proveedor de OB. Encontrar la preeclampsia temprana es importante para usted y bartholomew bebé. Llámanos al [phone].          LACTANCIA MATERNA     BENEFICIOS PARA LOS BEBÉS   ·       sistemas inmunológicos más james (menos alergias, eczema, asma y cáncer infantil)   ·       menos diarrea y estreñimiento u otras enfermedades GI   ·       menos resfriados e infecciones del oído   ·       mejor visión y dientes (menos cavidades)   ·       mejora el IQ   ·       menores tasas de diabetes y obesidad en la infancia     BENEFICIOS PARA LAS MADRES   ·        promueve la pérdida de peso más rápida después del parto   ·        paige riesgo para la depresión postparto   ·        paige riesgo para los cánceres de mama, útero y ovario   ·        paige riesgo para la osteoporosis en desarrollo con la edad   ·        siempre es más fácil que fórmula - correcto, limpio, y la temperatura adecuada   ·        menos costosa que la fórmula es gratis!!!     CLAVES PARA ROCKY LACTANCIA EXITOSA   ·        mantener bebé piel a piel hasta después de la primera alimentación evento   ·        tener a bebé en bartholomew cuarto con usted keena bartholomew estadía en el hospital después del parto   ·        evitar cualquier botella de alimentación (a menos que sea  médicamente necesario)   ·        limitar el uso de chupones y pañales   ·        Pida ayuda si usted está teniendo problemas... consultores de lactancia (que se especializan en la lactancia) están disponibles para ayudarle a   ·        rocky dieta saludable para mamá... comiendo rocky variedad de alimentos y raciones con moderación     COSAS QUE DEBES SABER SOBRE LA LACTANCIA   ·        mayoría de los medicamentos se considera compatible con la lactancia materna por la [employer], celia puedes consultarlo con bartholomew médico o en lactancia antes de zahida un medicamento nuevo... para estar seguro es seguro   ·        alcohol (cerveza, vino, licor) puede transmitirse de la madre al bebé a través de la leche materna... un ocasional, social bebida es considerado aceptable por la [employer]... más que eso deben evitarse   ·        la lactancia materna es NO es un método para evitar embarazo   ·        nicotina (cigarrillos) puede pasar de la madre al bebé a través de la leche materna... sin embargo, para las madres que fuman, es aún más saludable para amamantar que use la fórmula   ·        cafeína debería limitarse a 1-3 tazas por día... incluye café, refrescos, bebidas energéticas           MASAJE EN LA CHRIS PERINEAL O VAGINAL    Que puedo hacer ahora para reducir las probabilidades de desgarro keena el parto?  Masaje alrededor del orificio de la vagina realizado por usted misma (o por bartholomew thierno).  El masaje en esta chris, ya sea antes del parto o keena la segunda etapa del parto, puede reducir la probabilidad de desgarro perineal keena el parto.  Asimismo, el uso de compresas tibias en el perineo keena la etapa expulsiva del parto puede reducir la pravedad del desparro.  Painter sucedera keena la etapa expulsiva del parto.  En casa tambien puede reducir las probabilidades de sufrir lesiones durnate el parto de con masajes en la chris perineal.    Quien?  Todas las mujeres  embarazadas a partir de, aproximadamente, las 34 semanas de embarazo.    Cuando?  Usted o bartholomew thierno deben hacer masajes en la rica vaginal sushil 5 a 10 minutos de 1 a 4 veces por semana.    Helena?  Use rocky mezcla de agua y aceite de almendras, yuval u mackay con 1 o 2 dedos (katy la comodidad).  Inserte los dedos en la vagina entre 3 y 5cm.  Aplique presion general hacia abajo y hacia los costados sushil 5 a 10 minutos entre las 3 y las 9 horas, de 1 a 4 veces por semana.          SENALES SUSHIL EL EMBARAZO  Llame a nuestra oficina al [phone] para cualquiera de los siguientes:    1. Sangrado vaginal  2. Dolor abdominal hawa que no desaparece.  3. Fiebre (más de 100.4 y no se kirill con Tylenol)  4. Vómitos persistentes que pacheco más de 24 horas.  5. dolor de pecho  6. Dolor o ardor al orinar.  7. Dolor de vanessa severo que no se resuelve con Tylenol  8. Visión borrosa o haven puntos en bartholomew visión.  9. Hinchazón repentina de bartholomew maria luz o salina.  10. Enrojecimiento, hinchazón o dolor en rocky pierna.  11. Un aumento de peso repentino en pocos días.  12. Contar los movimientos fetales del bebé. (después de 28 semanas o el sexto mes de embarazo)  13. Rocky pérdida de líquido acuoso de la vagina: puede ser un chorro, un goteo o rocky humedad continua  14. Después de 20 semanas de embarazo, calambres rítmicos (más de 4 por hora) o menstruales helena dolor bajo / pélvico          VACUNAS SUSHIL EL EMBARAZO    TDAP  La tos ferina (o tos ferina) puede ser grave para cualquier persona, celia para bartholomew recién nacido, puede ser mortal. Hasta 20 recién nacidos mueren cada año en los [place] debido a la tos ferina. Aproximadamente la mitad de los bebés menores de 1 año de edad que contraen tos ferina necesitan tratamiento en el hospital. Cuanto más joven es el bebé cuando él o cliff son la tos ferina, más probable es que él o cliff tendrá que ser tratado en un hospital. Cuando usted recibe la vacuna contra la tos ferina (Tdap)  keena bartholomew embarazo, bartholomew cuerpo creará anticuerpos protectores y algunos de ellos pasan a bartholomew bebé antes de nacer. Estos anticuerpos pueden ayudar a proteger a bartholomew bebé contraigan la tos ferina hasta que tienen edad suficiente para recibir la vacuna ellos mismos (generalmente alrededor de 6 meses de edad).      INFLUENZA  Cambios en las funciones inmunológica, del corazón y pulmón keena el embarazo te hacen más susceptible a padecer seriamente enfermo de la gripe. Coger la gripe también aumenta las posibilidades de problemas graves para bartholomew bebé en desarrollo, incluyendo la entrega y el trabajo de parto prematuro. Se recomienda que todas las mujeres que están embarazadas keena la temporada de gripe deben recibir rocky vacuna contra la influenza.

## 2023-12-26 NOTE — PROGRESS NOTES
Adriana Meza presents today for routine OB visit at 30w5d.  Blood Pressure: 136/84  Wf=882 kg (244 lb 6.4 oz); Body mass index is 37.16 kg/m².; TWG=18.8 kg (41 lb 6.4 oz)  Fetal Heart Rate: 143; Fundal Height (cm): 32 cm  Abdomen: gravid, soft, non-tender.  She reports no complaints.  Denies uterine contractions.  Denies vaginal bleeding or leaking of fluid.  Reports adequate fetal movement of at least 10 movements in 2 hours once daily.  Scheduled for ultrasound 1/16/24.  Reviewed premature labor precautions and fetal kick counts.  Advised to continue medications and return in 2 weeks.      Current Outpatient Medications   Medication Instructions    aspirin 162 mg, Oral, Daily    Prenatal Vit-Fe Fumarate-FA (Prenatal Plus Vitamin/Mineral) 27-1 MG TABS 1 tablet, Oral, Daily       Laboratory workup: initial OB labs (done 7/26/23); 28 week labs (done 12/14/23)    Genetic Screening: NIPS negative, MSAFP negative    Vaccinations: influenza (given 10/4/23); Tdap (given 12/26/23); COVID-19 (given 8/9/23 & 8/30/21); RSV (will offer b/w 32w0d & 36w6d)    Postpartum contraception: desires Nexplanon    Fetal Ultrasounds:  7/11/23 (7w2d) EDC confirmed  8/30/23 (13w6d) NT WNL, EDENILSON fibroid (5.0 x 6.6 x 6.8cm)  10/18/23 (20w6d) no previa, johnathon WNL w/missed views  11/2/23 (23w0d) johnathon WNL w/missed views, 7cm EDENILSON intramural fibroid  12/14/23 (29w0d) EFW=96%, AC=87%, CHANDRAKANT=20.9cm, johnathon WNL w/missed views, vertex      G2 Problems (from 07/11/23 to present)       Problem Noted Resolved    CHTN 11/17/2023 by DANIELLE Rosenthal No    Overview Addendum 12/26/2023  8:37 AM by DANIELLE Rosenthal     Based on BP elevations @13 and 25 weeks  Needs baseline PEC labs - done WNL  Needs LDASA tx - taking  Serial growth scans - in progress  Consider AFS @32 weeks  Needs delivery @38-39w6d weeks         Uterine fibroid in pregnancy 8/30/2023 by Steph Mckinley MD No    Overview Addendum 12/26/2023  8:38 AM by  DANIELLE Rosenthal     EDENILSON fibroid (5.0 x 6.6 x 6.8cm)  Not obstructing fetal vertex @29 week ultrasound  Serial growth scans - in progress         Obesity affecting pregnancy 8/9/2023 by DANIELLE Rosenthal No    Overview Addendum 11/17/2023  3:31 PM by DANIELLE Rosenthal     Pre-pregnant BMI=30.87  Needs early GDM screen - done WNL  Serial growth scans - in progress

## 2023-12-26 NOTE — PROGRESS NOTES
Patient given t-dap on left deltoid on 12/26/23  28 week teaching    NDC# 05028-883-81  LOT# [id number]  EXP# [id number]

## 2024-01-10 ENCOUNTER — ROUTINE PRENATAL (OUTPATIENT)
Dept: OBGYN CLINIC | Facility: CLINIC | Age: 29
End: 2024-01-10

## 2024-01-10 VITALS
BODY MASS INDEX: 37.68 KG/M2 | HEIGHT: 68 IN | WEIGHT: 248.6 LBS | HEART RATE: 102 BPM | SYSTOLIC BLOOD PRESSURE: 142 MMHG | DIASTOLIC BLOOD PRESSURE: 84 MMHG

## 2024-01-10 DIAGNOSIS — Z34.93 PRENATAL CARE IN THIRD TRIMESTER: Primary | ICD-10-CM

## 2024-01-10 DIAGNOSIS — O10.919 CHRONIC HYPERTENSION DURING PREGNANCY: ICD-10-CM

## 2024-01-10 DIAGNOSIS — Z3A.32 32 WEEKS GESTATION OF PREGNANCY: ICD-10-CM

## 2024-01-10 NOTE — PROGRESS NOTES
[facility]  PRENATAL VISIT  Name: Adriana Meza  MRN: [id number]  : 1995      ASSESSMENT/PLAN:  Problem List       32 weeks gestation of pregnancy    Overview     Labs  Pap smear 2023, NILM   HIV, RPR, GC/CT, Hep C: negative  Rubella: immune  Hep B: immune  NIPT/MSAFP screen negative   28w labs wnl  GBS [ ]   Delivery consent signed 1/10/24           Obesity affecting pregnancy    Overview     Pre-pregnant BMI=30.87  Needs early GDM screen - done WNL  Serial growth scans - in progress         Uterine fibroid in pregnancy    Overview     EDENILSON fibroid (5.0 x 6.6 x 6.8cm)  Not obstructing fetal vertex @29 week ultrasound  Serial growth scans - in progress         CHTN    Overview     Based on BP elevations @13 and 25 weeks  Needs baseline PEC labs - done WNL  Needs LDASA tx - taking  Serial growth scans - in progress    Delivery plan 38 to 39w            SUBJECTIVE 28 y.o.  @ 32w6d here for PN visit. She denies contractions. She denies leakage of fluid and vaginal bleeding. She endorses good fetal movement. Her pregnancy is complicated by chronic hypertension. She is not currently on medication. She denies headache, vision changes, or RUQ pain today.     I consented the patient for delivery. The patient was counseled about the labor process. We discussed three routes of delivery including spontaneous vaginal delivery, operative vaginal delivery and  delivery. The patient was counseled on the risks of vaginal delivery including pain, vaginal/perineal tears and the need for repair at the bedside, infection and bleeding.      Patient counseled on indications necessitating operative vaginal delivery including: maternal medical indications in which patient would need to avoid pushing, prolonged second stage and nonreassuring fetal heart tracing during second stage. Patient counseled on maternal risks of vaginal delivery including increase risk of tearing and  hemorrhage. We also discussed fetal risks including intracranial hemorrhage, lacerations, nerve damage or fracture. Patient is aware that NICU providers would be available at the time of delivery to assess fetal status after delivery and need for resuscitation.      She was counseled on the indications for  section including: failed induction, arrest of dilation, persistent category II tracing and arrest of descent. She was counseled on the indications for emergent  section including evidence of worsening fetal or maternal status, and that there is a risk of general anesthesia. We discussed the risks of  including bleeding, infection, and injury to surrounding structures including the bowel and bladder.     She was counseled that there are medical and surgical methods to manage excessive postpartum bleeding. She was counseled that in the event of excessive blood loss, she may require blood transfusion which includes a small risk of blood borne diseases such as hepatitis and HIV. The patient is OK with receiving a blood transfusion if necessary.  The patient had an opportunity to ask questions and signed consent.         OBJECTIVE:  Vitals:    01/10/24 1415   BP: 142/84   Pulse: 102       Fundal height: size = dates  FHT: 130-140s by doppler       Future Appointments   Date Time Provider Department Center   2024 10:15 AM  [facility]   2024 10:45 AM DANIELLE Rosenthal [facility]         Gilberto Silva MD  OB/GYN PGY-2  1/10/2024  2:45 PM

## 2024-01-16 ENCOUNTER — ULTRASOUND (OUTPATIENT)
Age: 29
End: 2024-01-16
Payer: COMMERCIAL

## 2024-01-16 VITALS
DIASTOLIC BLOOD PRESSURE: 72 MMHG | BODY MASS INDEX: 38.01 KG/M2 | WEIGHT: 250.8 LBS | HEART RATE: 101 BPM | HEIGHT: 68 IN | SYSTOLIC BLOOD PRESSURE: 138 MMHG

## 2024-01-16 DIAGNOSIS — Z3A.33 33 WEEKS GESTATION OF PREGNANCY: Primary | ICD-10-CM

## 2024-01-16 DIAGNOSIS — E66.8 OTHER OBESITY AFFECTING PREGNANCY IN SECOND TRIMESTER: ICD-10-CM

## 2024-01-16 DIAGNOSIS — D25.9 UTERINE FIBROID IN PREGNANCY: ICD-10-CM

## 2024-01-16 DIAGNOSIS — O99.212 OTHER OBESITY AFFECTING PREGNANCY IN SECOND TRIMESTER: ICD-10-CM

## 2024-01-16 DIAGNOSIS — Z36.2 ENCOUNTER FOR FOLLOW-UP ULTRASOUND OF FETAL ANATOMY: ICD-10-CM

## 2024-01-16 DIAGNOSIS — O10.919 CHRONIC HYPERTENSION DURING PREGNANCY: ICD-10-CM

## 2024-01-16 DIAGNOSIS — Z36.89 ENCOUNTER FOR ULTRASOUND TO CHECK FETAL GROWTH: ICD-10-CM

## 2024-01-16 DIAGNOSIS — O34.10 UTERINE FIBROID IN PREGNANCY: ICD-10-CM

## 2024-01-16 PROCEDURE — 76816 OB US FOLLOW-UP PER FETUS: CPT | Performed by: OBSTETRICS & GYNECOLOGY

## 2024-01-16 PROCEDURE — 99213 OFFICE O/P EST LOW 20 MIN: CPT | Performed by: OBSTETRICS & GYNECOLOGY

## 2024-01-16 NOTE — PROGRESS NOTES
"[facility]: Adriana Meza was seen today for fetal growth and followup missed anatomy ultrasound.  See ultrasound report under \"OB Procedures\" tab.   The time spent on this established patient on the encounter date included 5 minutes previsit service time reviewing records and precharting, 10 minutes face-to-face service time counseling regarding results and coordinating care, and  5 minutes charting, totalling 20 minutes.  Please don't hesitate to contact our office with any concerns or questions.  -Elaine Gusman MD    "

## 2024-01-20 DIAGNOSIS — Z32.01 POSITIVE PREGNANCY TEST: ICD-10-CM

## 2024-01-22 ENCOUNTER — ROUTINE PRENATAL (OUTPATIENT)
Dept: OBGYN CLINIC | Facility: CLINIC | Age: 29
End: 2024-01-22

## 2024-01-22 VITALS
HEART RATE: 98 BPM | WEIGHT: 252 LBS | HEIGHT: 68 IN | BODY MASS INDEX: 38.19 KG/M2 | SYSTOLIC BLOOD PRESSURE: 129 MMHG | DIASTOLIC BLOOD PRESSURE: 85 MMHG

## 2024-01-22 DIAGNOSIS — Z3A.34 34 WEEKS GESTATION OF PREGNANCY: ICD-10-CM

## 2024-01-22 DIAGNOSIS — Z34.93 PRENATAL CARE IN THIRD TRIMESTER: Primary | ICD-10-CM

## 2024-01-22 PROCEDURE — 90678 RSV VACC PREF BIVALENT IM: CPT | Performed by: NURSE PRACTITIONER

## 2024-01-22 PROCEDURE — 90471 IMMUNIZATION ADMIN: CPT | Performed by: NURSE PRACTITIONER

## 2024-01-22 PROCEDURE — 99213 OFFICE O/P EST LOW 20 MIN: CPT | Performed by: NURSE PRACTITIONER

## 2024-01-22 RX ORDER — PRENATAL WITH FERROUS FUM AND FOLIC ACID 3080; 920; 120; 400; 22; 1.84; 3; 20; 10; 1; 12; 200; 27; 25; 2 [IU]/1; [IU]/1; MG/1; [IU]/1; MG/1; MG/1; MG/1; MG/1; MG/1; MG/1; UG/1; MG/1; MG/1; MG/1; MG/1
1 TABLET ORAL EVERY MORNING
Qty: 90 TABLET | Refills: 0 | Status: SHIPPED | OUTPATIENT
Start: 2024-01-22

## 2024-01-22 NOTE — PROGRESS NOTES
Adriana Meza presents today for routine OB visit at 34w4d.  Blood Pressure: 129/85  Ya=821 kg (252 lb); Body mass index is 38.32 kg/m².; TWG=22.2 kg (49 lb)  Fetal Heart Rate: 145; Fundal Height (cm): 34 cm  Abdomen: gravid, soft, non-tender.  She reports no complaints.  Reports occasional mild uterine contractions.  Denies vaginal bleeding or leaking of fluid.  Reports adequate fetal movement of at least 10 movements in 2 hours once daily.  Scheduled for ultrasound 2/13/24.  Reviewed premature labor precautions and fetal kick counts.  Advised to continue medications and return in 2 weeks.      Current Outpatient Medications   Medication Instructions    aspirin 162 mg, Oral, Daily    Prenatal 27-1 MG TABS 1 tablet, Oral, Every morning       Laboratory workup: initial OB labs (done 7/26/23); 28 week labs (done 12/14/23)    Genetic Screening: NIPS negative, MSAFP negative    Vaccinations: influenza (given 10/4/23); Tdap (given 12/26/23); COVID-19 (given 8/9/23 & 8/30/21); RSV (given 1/22/24)    Postpartum contraception: desires Nexplanon    Fetal Ultrasounds:  7/11/23 (7w2d) EDC confirmed  8/30/23 (13w6d) NT WNL, EDENILSON fibroid (5.0 x 6.6 x 6.8cm)  10/18/23 (20w6d) no previa, johnathon WNL w/missed views  11/2/23 (23w0d) johnathon WNL w/missed views, 7cm EDENILSON intramural fibroid  12/14/23 (29w0d) EFW=96%, AC=87%, CHANDRAKANT=20.9cm, johnathon WNL w/missed views, vertex  1/16/24 (33w5d) EFW=96%, AC=91%, CHANDRAKANT=16.0cm, johnathon WNL w/missed views, fibroid size stable, vertex      G2 Problems (from 07/11/23 to present)       Problem Noted Resolved    CHTN 11/17/2023 by DANIELLE Rosenthal No    Overview Addendum 1/22/2024 12:14 PM by DANIELLE Rosenthal     Based on BP elevations @13 and 25 weeks  Needs baseline PEC labs - done WNL  Needs LDASA tx - taking  Serial growth scans - in progress  Needs delivery @38w-39w6d - scheduled for induction 2/22/24 @0800         Uterine fibroid in pregnancy 8/30/2023 by Steph Mckinley MD  No    Overview Addendum 12/26/2023  8:38 AM by DANIELLE Rosenthal     EDENILSON fibroid (5.0 x 6.6 x 6.8cm)  Not obstructing fetal vertex @29 week ultrasound  Serial growth scans - in progress         Obesity affecting pregnancy 8/9/2023 by DANIELLE Rosenthal No    Overview Addendum 11/17/2023  3:31 PM by DANIELLE Rosenthal     Pre-pregnant BMI=30.87  Needs early GDM screen - done WNL  Serial growth scans - in progress

## 2024-01-22 NOTE — PATIENT INSTRUCTIONS
Bernadette por bartholomew confianza en nuestro equipo.   Le agradecemos y agradecemos debi comentarios.   Si recibe rocky encuesta nuestra, tómese unos momentos para informarnos cómo estamos.   Sinceramente,  CAM RosenthalNP       El Tercer Trimestre  (28-42 semanas)   BARTHOLOMEW BEBÉ   ·        bartholomew bebé chupa bartholomew dedo ahora!   ·        bartholomew bebé puede oír voces y responder al tacto... habla con él o cliff!!   ·        el cerebro de bartholomew bebé crece y se desarrolla más en los últimos 2 meses de embarazo   ·        vanessa y huesos del bebé son suaves y flexibles para que quepan por el canal del parto   ·        los movimientos del bebé cambian hacia el final del embarazo porque hay menos espacio para patear y estiramientos en tu vientre   ·        los pulmones del bebé no están completamente desarrollados y totalmente preparados para respirar por debi propios hasta el último 3-4 semanas antes de bartholomew fecha de vencimiento     TU CUERPO   ·        bartholomew vientre está creciendo mucho ahora   ·        será más difícil dormir ady de noche o ser tan activos darío eres generalmente   ·        puede sudar más de lo habitual   ·        serás más desequilibrado... tenga cuidado de no caer!   ·        Usted puede desarrollar hemorroides (qué pueden ser dolorosas y hacen difícil sentarse)   ·        los dos últimos meses del embarazo puede ser muy incómodos con ruby de espalda, ruby de vanessa y ardor de estómago   ·        Puedes empezar a tener contracciones... mientras son irregulares y menos de 5 por hora, esto es rocky parte normal de bartholomew cuerpo a punto de tener un bebé   ·        el ave uterino puede empezar a dilatar y abrir arriba... para estar listo para el parto   ·        Usted puede encontrarse que necesitan hacer orinar muy a menudo... porque el bebé está presionando mucho la vejiga   ·        Usted puede quedarse corta de respiracion más rápido de lo pura          CUENTAS DEL RETROCESO FETAL    En el tercer trimestre (después de 28  semanas de gestación) deben realizar patada fetal cuentas cada día. Obrien bebé debe moverse al menos 10 veces en 2 horas keena un tiempo activo, rocky vez al día.    Elegir el atime del día cuando el bebé es más activo. Trate de hacerlo a la misma hora cada día. Entrar en rocky posición cómoda y luego escribir el tiempo que tu bebé se mueve jam. Cuenta cada movimiento hasta que el bebé se mueve 10 veces. Estos movimientos incluyen patadas, puñetazos, codazos, revolotea o rollos. Athena puede tardar entre 5 minutos a 2 horas. Anote el tiempo que sientes movimiento 10 del bebé.    Si ha pasado 2 horas y tu bebé no se ha movido al menos 10 veces, usted debe llamar a la oficina de inmediato. [phone]          UMER DE PARTO PREMATURO     Cuando llamar a [phone]:  * Tengo que llamar inmediatamente si tengo incluso rocky pequeña cantidad de LIQUIDO de mi vagina, con o sin contracciones.   * Tengo que llamar si estoy SANGRADO de mi vagina.   * Tengo que llamar si tengo COLICOS que continúa después de beber 2 ó 3 vasos de agua y acostados en mi lado keena rocky hora y que se siente darío que estoy teniendo un período.   * Tengo que llamar si siento CONTRACCIONES más de 4 veces en rocky hora quando siento que mi elise se mantiene dura después de que trato de beber 2-3 vasos del agua y acostarme en mi lado keena rocky hora.   * Tengo que llamar si tyler un cambio de mi FLUJO vaginal.   * Tengo que llamar si siento la [place] que siente que el bebé aprieta en mi vagina y dura más de rocky hora.   * Tengo que llamar si tengo DOLOR BAJO DE LA ESPALDA que es nueva y está cerca de mi cóccix. Puede entrar y salir varias veces keena rocky hora o quedarse allí constantemente.         LA PRE-ECLAMPSIA    ¿Qué es? La preeclampsia es rocky enfermedad grave que puede ocurrir keena el embarazo se relaciona con la presión arterial billy. Le puede pasar a cualquier dylan.     ¿Por qué Yes importarme? Las mujeres Care preeclampsia  tienen riesgos graves pueden incluir convulsiones, trazo, daños en los órganos, nacimiento prematuro de bartholomew bebé. En los peor de los casos, puede causar la muerte de la madre y bartholomew bebé.     ¿Qué irvin pagar atención rocky? Signos y síntomas de la preeclampsia pueden incluir:   * Inflamación severa salina de la maria luz o las   * Dolor de vanessa todavía presente después de shoaib tomado Tylenol   * Hermann manchas o cambios en Lavista   * Dolor en la parte superior del abdomen o hombro   * Harmony náuseas y vómitos (en la segunda mitad del embarazo)   * Aumento de peso repentino   * Dificultad para respirar     ¿Qué irvin hacer? Si usted experimenta alguno de los síntomas de la preeclampsia, comuníquese con bartholomew proveedor de OB. Encontrar la preeclampsia temprana es importante para usted y bartholomew bebé. Llámanos al [phone].          LACTANCIA MATERNA     BENEFICIOS PARA LOS BEBÉS   ·       sistemas inmunológicos más james (menos alergias, eczema, asma y cáncer infantil)   ·       menos diarrea y estreñimiento u otras enfermedades GI   ·       menos resfriados e infecciones del oído   ·       mejor visión y dientes (menos cavidades)   ·       mejora el IQ   ·       menores tasas de diabetes y obesidad en la infancia     BENEFICIOS PARA LAS MADRES   ·        promueve la pérdida de peso más rápida después del parto   ·        paige riesgo para la depresión postparto   ·        paige riesgo para los cánceres de mama, útero y ovario   ·        paige riesgo para la osteoporosis en desarrollo con la edad   ·        siempre es más fácil que fórmula - correcto, limpio, y la temperatura adecuada   ·        menos costosa que la fórmula es gratis!!!     CLAVES PARA ROCKY LACTANCIA EXITOSA   ·        mantener bebé piel a piel hasta después de la primera alimentación evento   ·        tener a bebé en bartholomew cuarto con usted keena bartholomew estadía en el hospital después del parto   ·        evitar cualquier botella de alimentación (a menos que sea  médicamente necesario)   ·        limitar el uso de chupones y pañales   ·        Pida ayuda si usted está teniendo problemas... consultores de lactancia (que se especializan en la lactancia) están disponibles para ayudarle a   ·        rocky dieta saludable para mamá... comiendo rocky variedad de alimentos y raciones con moderación     COSAS QUE DEBES SABER SOBRE LA LACTANCIA   ·        mayoría de los medicamentos se considera compatible con la lactancia materna por la [employer], celia puedes consultarlo con bartholomew médico o en lactancia antes de zahida un medicamento nuevo... para estar seguro es seguro   ·        alcohol (cerveza, vino, licor) puede transmitirse de la madre al bebé a través de la leche materna... un ocasional, social bebida es considerado aceptable por la [employer]... más que eso deben evitarse   ·        la lactancia materna es NO es un método para evitar embarazo   ·        nicotina (cigarrillos) puede pasar de la madre al bebé a través de la leche materna... sin embargo, para las madres que fuman, es aún más saludable para amamantar que use la fórmula   ·        cafeína debería limitarse a 1-3 tazas por día... incluye café, refrescos, bebidas energéticas           MASAJE EN LA CHRIS PERINEAL O VAGINAL    Que puedo hacer ahora para reducir las probabilidades de desgarro keena el parto?  Masaje alrededor del orificio de la vagina realizado por usted misma (o por bartholomew thierno).  El masaje en esta chris, ya sea antes del parto o keena la segunda etapa del parto, puede reducir la probabilidad de desgarro perineal keena el parto.  Asimismo, el uso de compresas tibias en el perineo keena la etapa expulsiva del parto puede reducir la pravedad del desparro.  [place] sucedera keena la etapa expulsiva del parto.  En casa tambien puede reducir las probabilidades de sufrir lesiones durnate el parto de con masajes en la chris perineal.    Quien?  Todas las mujeres  embarazadas a partir de, aproximadamente, las 34 semanas de embarazo.    Cuando?  Usted o bartholomew thierno deben hacer masajes en la rica vaginal sushil 5 a 10 minutos de 1 a 4 veces por semana.    Helena?  Use rocky mezcla de agua y aceite de almendras, yuval u mackay con 1 o 2 dedos (katy la comodidad).  Inserte los dedos en la vagina entre 3 y 5cm.  Aplique presion general hacia abajo y hacia los costados sushil 5 a 10 minutos entre las 3 y las 9 horas, de 1 a 4 veces por semana.          SENALES SUSHIL EL EMBARAZO  Llame a nuestra oficina al [phone] para cualquiera de los siguientes:    1. Sangrado vaginal  2. Dolor abdominal hawa que no desaparece.  3. Fiebre (más de 100.4 y no se kirill con Tylenol)  4. Vómitos persistentes que pacheco más de 24 horas.  5. dolor de pecho  6. Dolor o ardor al orinar.  7. Dolor de vanessa severo que no se resuelve con Tylenol  8. Visión borrosa o haven puntos en bartholomew visión.  9. Hinchazón repentina de bartholomew maria luz o salina.  10. Enrojecimiento, hinchazón o dolor en rocky pierna.  11. Un aumento de peso repentino en pocos días.  12. Contar los movimientos fetales del bebé. (después de 28 semanas o el sexto mes de embarazo)  13. Rocky pérdida de líquido acuoso de la vagina: puede ser un chorro, un goteo o rocky humedad continua  14. Después de 20 semanas de embarazo, calambres rítmicos (más de 4 por hora) o menstruales ehlena dolor bajo / pélvico          VACUNAS SUSHIL EL EMBARAZO    TDAP  La tos ferina (o tos ferina) puede ser grave para cualquier persona, celia para bartholomew recién nacido, puede ser mortal. Hasta 20 recién nacidos mueren cada año en los [place] debido a la tos ferina. Aproximadamente la mitad de los bebés menores de 1 año de edad que contraen tos ferina necesitan tratamiento en el hospital. Cuanto más joven es el bebé cuando él o cliff son la tos ferina, más probable es que él o cliff tendrá que ser tratado en un hospital. Cuando usted recibe la vacuna contra la tos ferina (Tdap)  keena bartholomew embarazo, bartholomew cuerpo creará anticuerpos protectores y algunos de ellos pasan a bartholomew bebé antes de nacer. Estos anticuerpos pueden ayudar a proteger a bartholomew bebé contraigan la tos ferina hasta que tienen edad suficiente para recibir la vacuna ellos mismos (generalmente alrededor de 6 meses de edad).      INFLUENZA  Cambios en las funciones inmunológica, del corazón y pulmón keena el embarazo te hacen más susceptible a padecer seriamente enfermo de la gripe. Coger la gripe también aumenta las posibilidades de problemas graves para bartholomew bebé en desarrollo, incluyendo la entrega y el trabajo de parto prematuro. Se recomienda que todas las mujeres que están embarazadas keena la temporada de gripe deben recibir rocky vacuna contra la influenza.

## 2024-01-22 NOTE — PROGRESS NOTES
Rsv vaccine given to pt in left deltoid on 1/22/2024    NDC: 0277-6069-42  LOT: [id number]  EXP: 03/31/2025

## 2024-01-31 NOTE — PROGRESS NOTES
[facility]   PRENATAL VISIT  Adriana Meza  [id number]  1995        ASSESSMENT/PLAN:  Problem List       36 weeks gestation of pregnancy    Overview     Overview:  Labs  Pap smear wnl ; GC/CT neg  Prenatal panel completed  28w labs completed  GBS collected  Vaccines:  Flu vaccine: 10/4/23  Tdap vaccine: 23  Genetic screening - NIPS, AFP wnl  Contraception:  Nexplanon  Feeding plan: breast/bottle   Birth plan:    Delivery consent:  24  Ultrasounds:   24: EFW 96%, AC 91%, HC 88%  Next: 24    Assessment and Plan:  No obstetric complaints  Discussed  labor precautions and fetal kick counts  Return to clinic in 1 week         Obesity affecting pregnancy    Overview     Pre-pregnant BMI=30.87  Needs early GDM screen - done WNL  Serial growth scans - in progress         Uterine fibroid in pregnancy    Overview     EDENILSON fibroid (5.0 x 6.6 x 6.8cm)  Not obstructing fetal vertex @29 week ultrasound  Serial growth scans - in progress         CHTN    Overview     Based on BP elevations @13 and 25 weeks  Needs baseline PEC labs - done WNL  Needs LDASA tx - taking  Serial growth scans - in progress  Needs delivery @38w- scheduled for induction 2/15/24 @0800AM  Repeat PreE labwork ordered 24 for worsening swelling              Subjective: 28 y.o.  35w6d here for prenatal visit. She denies contractions. She denies leakage of fluid and vaginal bleeding. She endorses good fetal movement.     Patient complaining of worsening swelling of both legs. She had a headache last night that resolved. Currently denies headache, vision changes, chest pain, SOB, RUQ pain, or leg pain. Discussed that owing to worsening swelling, I would like to move her induction to 38 weeks rather than 39 weeks given that she currently has chronic hypertension and is not on medications. She agrees to complete repeat labwork for pre-eclampsia.    I consented the patient for induction. The  patient was counseled about the induction process including likelihood of need for cytotec and bray balloon for cervical ripening. This will then be followed by pitocin to induce contractions and likely artifical rupture of membranes.  We discussed three routes of delivery including spontaneous vaginal delivery, operative vaginal delivery and  delivery. The patient was counseled on the risks of vaginal delivery including pain, vaginal/perineal tears and the need for repair at the bedside, infection and bleeding.      Patient counseled on indications necessitating operative vaginal delivery including: maternal medical indications in which patient would need to avoid pushing, prolonged second stage and nonreassuring fetal heart tracing during second stage. Patient counseled on maternal risks of vaginal delivery including increase risk of tearing and hemorrhage. We also discussed fetal risks including intracranial hemorrhage, lacerations, nerve damage or fracture. Patient is aware that NICU providers would be available at the time of delivery to assess fetal status after delivery and need for resuscitation.      She was counseled on the indications for  section including: failed induction, arrest of dilation, persistent category II tracing and arrest of descent. She was counseled on the indications for emergent  section including evidence of worsening fetal or maternal status, and that there is a risk of general anesthesia. We discussed the risks of  including bleeding, infection, and injury to surrounding structures including the bowel and bladder.     She was counseled that there are medical and surgical methods to manage excessive postpartum bleeding. She was counseled that in the event of excessive blood loss, she may require blood transfusion which includes a small risk of blood borne diseases such as hepatitis and HIV. The patient is OK with receiving a blood transfusion if  necessary.  The patient had an opportunity to ask questions and signed consent.      Objective:  Pre-Becky Vitals      Flowsheet Row Most Recent Value   Prenatal Assessment    Fetal Heart Rate 155   Fundal Height (cm) 36 cm   Prenatal Vitals    Blood Pressure 146/84   Weight - Scale 116 kg (256 lb)   Urine Albumin/Glucose    Dilation/Effacement/Station    Vaginal Drainage    Edema              Pregnancy Plan:  Pregnancy: Wilkinson  Fetal sex: Female     Delivery Plans  Deliver by GA (weeks): 39  Planned delivery method: Vaginal  Planned delivery location: [facility]  Planned anesthesia: Epidural  Acceptable blood products: All     Post-Delivery Plans  Feeding intentions: Breast Milk and Non-human milk substitute  Planned birth control: Implant      General: Well appearing, no distress  Respiratory: Unlabored breathing  Cardiovascular: Regular rate.  Abdomen: Soft, gravid, nontender  Fundal Height: Appropriate for gestational age.  Extremities: Warm and well perfused.  Non tender.        D/w Dr. Jean-Paul Webster MD  PGY-2

## 2024-02-06 ENCOUNTER — ROUTINE PRENATAL (OUTPATIENT)
Dept: OBGYN CLINIC | Facility: CLINIC | Age: 29
End: 2024-02-06

## 2024-02-06 ENCOUNTER — APPOINTMENT (OUTPATIENT)
Dept: LAB | Facility: HOSPITAL | Age: 29
End: 2024-02-06
Payer: COMMERCIAL

## 2024-02-06 VITALS
HEIGHT: 68 IN | WEIGHT: 256 LBS | SYSTOLIC BLOOD PRESSURE: 146 MMHG | DIASTOLIC BLOOD PRESSURE: 84 MMHG | HEART RATE: 94 BPM | BODY MASS INDEX: 38.8 KG/M2

## 2024-02-06 DIAGNOSIS — Z3A.36 36 WEEKS GESTATION OF PREGNANCY: ICD-10-CM

## 2024-02-06 DIAGNOSIS — O10.919 CHRONIC HYPERTENSION DURING PREGNANCY: ICD-10-CM

## 2024-02-06 DIAGNOSIS — O10.919 CHRONIC HYPERTENSION DURING PREGNANCY: Primary | ICD-10-CM

## 2024-02-06 LAB
ALBUMIN SERPL BCP-MCNC: 3.6 G/DL (ref 3.5–5)
ALP SERPL-CCNC: 133 U/L (ref 34–104)
ALT SERPL W P-5'-P-CCNC: 10 U/L (ref 7–52)
ANION GAP SERPL CALCULATED.3IONS-SCNC: 8 MMOL/L
AST SERPL W P-5'-P-CCNC: 21 U/L (ref 13–39)
BILIRUB SERPL-MCNC: 0.36 MG/DL (ref 0.2–1)
BUN SERPL-MCNC: 5 MG/DL (ref 5–25)
CALCIUM SERPL-MCNC: 9.1 MG/DL (ref 8.4–10.2)
CHLORIDE SERPL-SCNC: 105 MMOL/L (ref 96–108)
CO2 SERPL-SCNC: 24 MMOL/L (ref 21–32)
CREAT SERPL-MCNC: 0.67 MG/DL (ref 0.6–1.3)
CREAT UR-MCNC: 57.2 MG/DL
ERYTHROCYTE [DISTWIDTH] IN BLOOD BY AUTOMATED COUNT: 13.9 % (ref 11.6–15.1)
GFR SERPL CREATININE-BSD FRML MDRD: 121 ML/MIN/1.73SQ M
GLUCOSE P FAST SERPL-MCNC: 91 MG/DL (ref 65–99)
HCT VFR BLD AUTO: 36.5 % (ref 34.8–46.1)
HGB BLD-MCNC: 11.4 G/DL (ref 11.5–15.4)
MCH RBC QN AUTO: 29.8 PG (ref 26.8–34.3)
MCHC RBC AUTO-ENTMCNC: 31.2 G/DL (ref 31.4–37.4)
MCV RBC AUTO: 96 FL (ref 82–98)
PLATELET # BLD AUTO: 227 THOUSANDS/UL (ref 149–390)
PMV BLD AUTO: 10.2 FL (ref 8.9–12.7)
POTASSIUM SERPL-SCNC: 4.1 MMOL/L (ref 3.5–5.3)
PROT SERPL-MCNC: 6.8 G/DL (ref 6.4–8.4)
PROT UR-MCNC: 18 MG/DL
PROT/CREAT UR: 0.31 MG/G{CREAT} (ref 0–0.1)
RBC # BLD AUTO: 3.82 MILLION/UL (ref 3.81–5.12)
SODIUM SERPL-SCNC: 137 MMOL/L (ref 135–147)
WBC # BLD AUTO: 8.09 THOUSAND/UL (ref 4.31–10.16)

## 2024-02-06 PROCEDURE — 36415 COLL VENOUS BLD VENIPUNCTURE: CPT

## 2024-02-06 PROCEDURE — 82570 ASSAY OF URINE CREATININE: CPT

## 2024-02-06 PROCEDURE — 80053 COMPREHEN METABOLIC PANEL: CPT

## 2024-02-06 PROCEDURE — 84156 ASSAY OF PROTEIN URINE: CPT

## 2024-02-06 PROCEDURE — 87150 DNA/RNA AMPLIFIED PROBE: CPT

## 2024-02-06 PROCEDURE — 99213 OFFICE O/P EST LOW 20 MIN: CPT | Performed by: OBSTETRICS & GYNECOLOGY

## 2024-02-06 PROCEDURE — 85027 COMPLETE CBC AUTOMATED: CPT

## 2024-02-07 ENCOUNTER — TELEPHONE (OUTPATIENT)
Dept: OBGYN CLINIC | Facility: CLINIC | Age: 29
End: 2024-02-07

## 2024-02-07 PROBLEM — O11.9 CHRONIC HYPERTENSION WITH SUPERIMPOSED PREECLAMPSIA: Status: ACTIVE | Noted: 2023-11-17

## 2024-02-07 NOTE — TELEPHONE ENCOUNTER
Called patient with  Kika [phone] regarding her new diagnosis of chronic hypertension with superimposed pre-eclampsia without severe features based on UPC of 0.31. Patient is now scheduled for a induction of labor at 37w at [facility] for 8AM. Patient states understanding and has no questions.    Milana TRUJILLO PGY2

## 2024-02-08 ENCOUNTER — HOSPITAL ENCOUNTER (OUTPATIENT)
Dept: LABOR AND DELIVERY | Facility: HOSPITAL | Age: 29
Discharge: HOME | DRG: 560 | End: 2024-02-08
Payer: COMMERCIAL

## 2024-02-08 ENCOUNTER — HOSPITAL ENCOUNTER (INPATIENT)
Facility: HOSPITAL | Age: 29
LOS: 2 days | Discharge: HOME | DRG: 560 | End: 2024-02-10
Attending: OBSTETRICS & GYNECOLOGY | Admitting: OBSTETRICS & GYNECOLOGY
Payer: COMMERCIAL

## 2024-02-08 DIAGNOSIS — Z3A.37 37 WEEKS GESTATION OF PREGNANCY: ICD-10-CM

## 2024-02-08 PROBLEM — Z34.90 ENCOUNTER FOR INDUCTION OF LABOR: Status: ACTIVE | Noted: 2024-02-08

## 2024-02-08 LAB
ABO GROUP BLD: NORMAL
ALBUMIN SERPL BCP-MCNC: 3.4 G/DL (ref 3.5–5)
ALP SERPL-CCNC: 142 U/L (ref 34–104)
ALT SERPL W P-5'-P-CCNC: 10 U/L (ref 7–52)
ANION GAP SERPL CALCULATED.3IONS-SCNC: 7 MMOL/L
AST SERPL W P-5'-P-CCNC: 21 U/L (ref 13–39)
BASOPHILS # BLD AUTO: 0.01 THOUSANDS/ÂΜL (ref 0–0.1)
BASOPHILS NFR BLD AUTO: 0 % (ref 0–1)
BILIRUB SERPL-MCNC: 0.41 MG/DL (ref 0.2–1)
BLD GP AB SCN SERPL QL: NEGATIVE
BUN SERPL-MCNC: 8 MG/DL (ref 5–25)
CALCIUM ALBUM COR SERPL-MCNC: 9.8 MG/DL (ref 8.3–10.1)
CALCIUM SERPL-MCNC: 9.3 MG/DL (ref 8.4–10.2)
CHLORIDE SERPL-SCNC: 105 MMOL/L (ref 96–108)
CO2 SERPL-SCNC: 23 MMOL/L (ref 21–32)
CREAT SERPL-MCNC: 0.64 MG/DL (ref 0.6–1.3)
EOSINOPHIL # BLD AUTO: 0.03 THOUSAND/ÂΜL (ref 0–0.61)
EOSINOPHIL NFR BLD AUTO: 0 % (ref 0–6)
ERYTHROCYTE [DISTWIDTH] IN BLOOD BY AUTOMATED COUNT: 13.8 % (ref 11.6–15.1)
GFR SERPL CREATININE-BSD FRML MDRD: 123 ML/MIN/1.73SQ M
GLUCOSE SERPL-MCNC: 83 MG/DL (ref 65–140)
GP B STREP DNA SPEC QL NAA+PROBE: NEGATIVE
HCT VFR BLD AUTO: 37 % (ref 34.8–46.1)
HGB BLD-MCNC: 11.9 G/DL (ref 11.5–15.4)
IMM GRANULOCYTES # BLD AUTO: 0.04 THOUSAND/UL (ref 0–0.2)
IMM GRANULOCYTES NFR BLD AUTO: 0 % (ref 0–2)
LYMPHOCYTES # BLD AUTO: 2.15 THOUSANDS/ÂΜL (ref 0.6–4.47)
LYMPHOCYTES NFR BLD AUTO: 20 % (ref 14–44)
MCH RBC QN AUTO: 30.1 PG (ref 26.8–34.3)
MCHC RBC AUTO-ENTMCNC: 32.2 G/DL (ref 31.4–37.4)
MCV RBC AUTO: 93 FL (ref 82–98)
MONOCYTES # BLD AUTO: 0.93 THOUSAND/ÂΜL (ref 0.17–1.22)
MONOCYTES NFR BLD AUTO: 9 % (ref 4–12)
NEUTROPHILS # BLD AUTO: 7.65 THOUSANDS/ÂΜL (ref 1.85–7.62)
NEUTS SEG NFR BLD AUTO: 71 % (ref 43–75)
NRBC BLD AUTO-RTO: 0 /100 WBCS
PLATELET # BLD AUTO: 247 THOUSANDS/UL (ref 149–390)
PMV BLD AUTO: 10.1 FL (ref 8.9–12.7)
POTASSIUM SERPL-SCNC: 4.2 MMOL/L (ref 3.5–5.3)
PROT SERPL-MCNC: 6.6 G/DL (ref 6.4–8.4)
RBC # BLD AUTO: 3.96 MILLION/UL (ref 3.81–5.12)
RH BLD: POSITIVE
SODIUM SERPL-SCNC: 135 MMOL/L (ref 135–147)
SPECIMEN EXPIRATION DATE: NORMAL
TREPONEMA PALLIDUM IGG+IGM AB [PRESENCE] IN SERUM OR PLASMA BY IMMUNOASSAY: NORMAL
WBC # BLD AUTO: 10.81 THOUSAND/UL (ref 4.31–10.16)

## 2024-02-08 PROCEDURE — 80053 COMPREHEN METABOLIC PANEL: CPT | Performed by: OBSTETRICS & GYNECOLOGY

## 2024-02-08 PROCEDURE — 4A1HXCZ MONITORING OF PRODUCTS OF CONCEPTION, CARDIAC RATE, EXTERNAL APPROACH: ICD-10-PCS | Performed by: OBSTETRICS & GYNECOLOGY

## 2024-02-08 PROCEDURE — 85025 COMPLETE CBC W/AUTO DIFF WBC: CPT | Performed by: OBSTETRICS & GYNECOLOGY

## 2024-02-08 PROCEDURE — 86850 RBC ANTIBODY SCREEN: CPT | Performed by: OBSTETRICS & GYNECOLOGY

## 2024-02-08 PROCEDURE — 10907ZC DRAINAGE OF AMNIOTIC FLUID, THERAPEUTIC FROM PRODUCTS OF CONCEPTION, VIA NATURAL OR ARTIFICIAL OPENING: ICD-10-PCS | Performed by: OBSTETRICS & GYNECOLOGY

## 2024-02-08 PROCEDURE — 86780 TREPONEMA PALLIDUM: CPT | Performed by: OBSTETRICS & GYNECOLOGY

## 2024-02-08 PROCEDURE — 86901 BLOOD TYPING SEROLOGIC RH(D): CPT | Performed by: OBSTETRICS & GYNECOLOGY

## 2024-02-08 PROCEDURE — 86900 BLOOD TYPING SEROLOGIC ABO: CPT | Performed by: OBSTETRICS & GYNECOLOGY

## 2024-02-08 PROCEDURE — NC001 PR NO CHARGE: Performed by: OBSTETRICS & GYNECOLOGY

## 2024-02-08 RX ORDER — ONDANSETRON 2 MG/ML
4 INJECTION INTRAMUSCULAR; INTRAVENOUS EVERY 6 HOURS PRN
Status: DISCONTINUED | OUTPATIENT
Start: 2024-02-08 | End: 2024-02-09

## 2024-02-08 RX ORDER — CALCIUM CARBONATE 500 MG/1
1000 TABLET, CHEWABLE ORAL 2 TIMES DAILY PRN
Status: DISCONTINUED | OUTPATIENT
Start: 2024-02-08 | End: 2024-02-09

## 2024-02-08 RX ORDER — SODIUM CHLORIDE, SODIUM LACTATE, POTASSIUM CHLORIDE, CALCIUM CHLORIDE 600; 310; 30; 20 MG/100ML; MG/100ML; MG/100ML; MG/100ML
125 INJECTION, SOLUTION INTRAVENOUS CONTINUOUS
Status: DISCONTINUED | OUTPATIENT
Start: 2024-02-08 | End: 2024-02-09

## 2024-02-08 RX ORDER — BUPIVACAINE HYDROCHLORIDE 2.5 MG/ML
30 INJECTION, SOLUTION EPIDURAL; INFILTRATION; INTRACAUDAL ONCE AS NEEDED
Status: DISCONTINUED | OUTPATIENT
Start: 2024-02-08 | End: 2024-02-09

## 2024-02-08 RX ORDER — OXYTOCIN/RINGER'S LACTATE 30/500 ML
1-30 PLASTIC BAG, INJECTION (ML) INTRAVENOUS
Status: DISCONTINUED | OUTPATIENT
Start: 2024-02-08 | End: 2024-02-09

## 2024-02-08 RX ADMIN — SODIUM CHLORIDE, SODIUM LACTATE, POTASSIUM CHLORIDE, AND CALCIUM CHLORIDE 125 ML/HR: .6; .31; .03; .02 INJECTION, SOLUTION INTRAVENOUS at 21:10

## 2024-02-08 RX ADMIN — SODIUM CHLORIDE, SODIUM LACTATE, POTASSIUM CHLORIDE, AND CALCIUM CHLORIDE 125 ML/HR: .6; .31; .03; .02 INJECTION, SOLUTION INTRAVENOUS at 09:19

## 2024-02-08 RX ADMIN — Medication 2 MILLI-UNITS/MIN: at 12:17

## 2024-02-08 NOTE — ASSESSMENT & PLAN NOTE
Up to date on prenatal care  AB+  GBS pending  EFW 96% (AC 91%, HC 88%)  Chronic hypertension, not on medication

## 2024-02-08 NOTE — OB LABOR/OXYTOCIN SAFETY PROGRESS
Labor Progress Note - Adriana CortorrealTorres 28 y.o. female MRN: [id number]    Unit/Bed#: L&D 326-01 Encounter: [id number]       Contraction Frequency (minutes): 2-4  Contraction Intensity: Mild  Uterine Activity Characteristics: Irregular  Cervical Dilation: 3        Cervical Effacement: 60  Fetal Station: -3  Baseline Rate (FHR): 140 bpm  Fetal Heart Rate (FHT): 140 BPM  FHR Category: 1               Vital Signs:   Vitals:    02/08/24 1053   BP: 141/79   Pulse: 71   Resp:    Temp:        Patient comfortable, not feeling her contractions. FB expelled.  SVE as above. FHT cat 1. Mathew q2-4, irregularly. Plan to start pitocin. D/w Dr. Mccormick.       Valentina Hutton MD 2/8/2024 11:58 AM

## 2024-02-08 NOTE — ASSESSMENT & PLAN NOTE
Chronic HTN diagnosed in pregnancy, not on medication   Now with superimposed preeclampsia (urine PC 0.31) on 2/6/24  Admit for IOL  Admission labs - CBC, CMP, T&S, syphilis screen  Clear liquid diet  Anesthesia consult placed   Rh+, Rhogam not indicated   GBS pending, prophylaxis not indicated  IOL plan: cytotec/FB with transition to pitocin

## 2024-02-08 NOTE — ASSESSMENT & PLAN NOTE
CHTN based on BP elevations @13 and 25 weeks  Baseline PEC labs wnl   Dx w/superimposed PEC @36w5d (Urine PC 0.31)  Currently asymptomatic   F/u admission CBC and CMP   Monitor BP and symptoms intrapartum

## 2024-02-08 NOTE — OB LABOR/OXYTOCIN SAFETY PROGRESS
Oxytocin Safety Progress Check Note - Adriana CoronaealTorres 28 y.o. female MRN: [id number]    Unit/Bed#: L&D 326-01 Encounter: [id number]    Dose (helio-units/min) Oxytocin: 14 helio-units/min  Contraction Frequency (minutes): 1.5-3  Contraction Intensity: Mild  Uterine Activity Characteristics: Irregular  Cervical Dilation: 3        Cervical Effacement: 70  Fetal Station: -2  Baseline Rate (FHR): 140 bpm  Fetal Heart Rate (FHT): 150 BPM  FHR Category: CAT II                Vital Signs:   Vitals:    24 1649   BP: 140/71   Pulse: 84   Resp:    Temp:        Notes/comments:   A prolonged deceleration was noted on FHT when patient returned from the restroom and placed on the monitor . Initially GHT was 70bpm and 1 minuter later arleth was 60bpm. Patient was repositioned , pitocin stopped and fluid bolus given. Fetal heart rate was started to improve. SVE was checked and noted to be 3/70/-2. Membranes intact. A pulse ox was placed to confirm maternal HR which was in the 80's bpm. FHT rate was then noted to return to baseline after 6 minutes of prolonged deceleration. Moderate variability present.     Discussed with the patient the implications of recurrent prolonged decelerations including need for emergent  section delivery. At this time this is the first prolonged deceleration and given the return to baseline with moderate variability it Is reasonable to monitor FHT for the next 30 min-1 hr and if CAT I to restart Pitocin for continuation of the induction. Patient  expressed understanding states that at some point she would like to get an epidural.     Dr. Mccormick aware     Berta Yanez MD 2024 5:21 PM

## 2024-02-08 NOTE — OB LABOR/OXYTOCIN SAFETY PROGRESS
Oxytocin Safety Progress Check Note - Adriana CortorrealTorres 28 y.o. female MRN: [id number]    Unit/Bed#: L&D 326-01 Encounter: [id number]    Dose (helio-units/min) Oxytocin: 10 helio-units/min  Contraction Frequency (minutes): 2-4  Contraction Intensity: Mild  Uterine Activity Characteristics: Irregular  Cervical Dilation: 3        Cervical Effacement: 70  Fetal Station: -3  Baseline Rate (FHR): 150 bpm  Fetal Heart Rate (FHT): 150 BPM  FHR Category: 1               Vital Signs:   Vitals:    02/08/24 1449   BP: 126/62   Pulse: 75   Resp:    Temp:      Patient comfortable, not feeling her contractions.  SVE unchanged, as above. FHT cat 1. Mathew q2-4. Pitocin at 10, will continue to titrate as tolerated. Consider AROM at next check. D/w Dr. Mccormick.       Valentina Hutton MD 2/8/2024 3:32 PM

## 2024-02-08 NOTE — OB LABOR/OXYTOCIN SAFETY PROGRESS
Labor Progress Note - Adriana CortorrealTorres 28 y.o. female MRN: [id number]    Unit/Bed#: L&D 326-01 Encounter: [id number]       Contraction Frequency (minutes): 3     Uterine Activity Characteristics: Irregular  Cervical Dilation: 1        Cervical Effacement: 50  Fetal Station: -3  Baseline Rate (FHR): 145 bpm  Fetal Heart Rate (FHT): 145 BPM  FHR Category: 1               Vital Signs:   Vitals:    02/08/24 0816   BP: 139/73   Pulse: 93   Resp: 18   Temp: 98.3 °F (36.8 °C)     SVE 1/50/-3. Mathew irregularly approx q3. Bray balloon placed per procedure below:    A 24F bray with a 30cc balloon was selected. SVE was performed and cervix was located. Bray was introduced over sterile gloved hands. Balloon advanced through cervix beyond the internal cervical os. A small amount amount of sterile normal saline solution was instilled in the balloon to confirm placement. Placement was confirmed to be beyond the internal cervical os. A total of 60cc of sterile normal saline solution was instilled into the balloon. Patient tolerated well. Instructions left with RN to place bray to gravity with a 1L bag of IV fluid. Notify MD when bray dislodged.      Valentina Hutton MD 2/8/2024 9:05 AM

## 2024-02-08 NOTE — H&P
"H & P- Obstetrics   Adriana Meza 28 y.o. female MRN: [id number]  Unit/Bed#: L&D 326-01 Encounter: [id number]    Assessment: 28 y.o.  at 37w0d admitted for IOL in setting of chronic hypertension.  SVE: /-4  FHT: cat1  Clinical EFW: 98%; Cephalic confirmed by TAUS  GBS status: pending   Postpartum contraception plan: Nexplanon    Plan:   * Encounter for induction of labor  Assessment & Plan  Chronic HTN diagnosed in pregnancy, not on medication   Now with superimposed preeclampsia (urine PC 0.31) on 24  Admit for IOL  Admission labs - CBC, CMP, T&S, syphilis screen  Clear liquid diet  Anesthesia consult placed   Rh+, Rhogam not indicated   GBS pending, prophylaxis not indicated  IOL plan: cytotec/FB with transition to pitocin      CHTN with superimposed preeclampsia  Assessment & Plan  CHTN based on BP elevations @13 and 25 weeks  Baseline PEC labs wnl   Dx w/superimposed PEC @36w5d (Urine PC 0.31)  Currently asymptomatic   F/u admission CBC and CMP   Monitor BP and symptoms intrapartum         Uterine fibroid in pregnancy  Assessment & Plan  EDENILSON fibroid (5.0 x 6.6 x 6.8cm)   Not obstructing fetal vertex @29 week ultrasound    Obesity affecting pregnancy  Assessment & Plan  BMI 39   Will need lovenox postpartum if requires C/S delivery    37 weeks gestation of pregnancy  Assessment & Plan  Up to date on prenatal care  AB+  GBS pending  EFW 96% (AC 91%, HC 88%)  Chronic hypertension, not on medication        Discussed case and plan w/ Dr. Mccormick.      Chief Complaint: \"I'm here for my induction.\"     HPI: Adriana Meza is a 28 y.o.  with an CATALINO of 2024, by Last Menstrual Period at 37w0d who is being admitted for IOL (cHTN). She denies having uterine contractions, has no LOF, and reports no VB. She states she has felt good FM.    Patient Active Problem List   Diagnosis    37 weeks gestation of pregnancy    Obesity affecting pregnancy    Uterine fibroid in " pregnancy    CHTN with superimposed preeclampsia    Encounter for induction of labor       Baby complications/comments: none.    Review of Systems   Constitutional:  Negative for chills and fever.   Respiratory:  Negative for cough and shortness of breath.    Gastrointestinal:  Negative for diarrhea, nausea and vomiting.   Genitourinary:  Negative for dysuria and hematuria.   Skin:  Negative for color change and rash.   Neurological:  Negative for dizziness, syncope and headaches.       OB Hx:  OB History    Para Term  AB Living   2 0 0 0 1 0   SAB IAB Ectopic Multiple Live Births   1 0 0 0 0      # Outcome Date GA Lbr Javier/2nd Weight Sex Delivery Anes PTL Lv   2 Current            1 2019 8w0d              Past Medical Hx:  Past Medical History:   Diagnosis Date    Anxiety     Pancreatitis        Past Surgical hx:  Past Surgical History:   Procedure Laterality Date    NO PAST SURGERIES         Social Hx:  Alcohol use: denies  Tobacco use: denies  Other substance use: denies    Allergies   Allergen Reactions    Shellfish-Derived Products - Food Allergy Dizziness     Nausea, throat closes       Medications Prior to Admission   Medication    aspirin 81 mg chewable tablet    Prenatal 27-1 MG TABS       Objective:  Temp:  [98.3 °F (36.8 °C)] 98.3 °F (36.8 °C)  HR:  [93] 93  Resp:  [18] 18  BP: (139)/(73) 139/73  Body mass index is 38.92 kg/m².     Physical Exam:  Physical Exam  Constitutional:       Appearance: Normal appearance.   Genitourinary:      Vulva normal.   Cardiovascular:      Rate and Rhythm: Normal rate.   Pulmonary:      Effort: Pulmonary effort is normal.   Abdominal:      Palpations: Abdomen is soft.   Neurological:      General: No focal deficit present.      Mental Status: She is alert and oriented to person, place, and time.   Skin:     General: Skin is dry.   Psychiatric:         Mood and Affect: Mood normal.          FHT:  Baseline Rate (FHR): 145 bpm  Variability:  Moderate  Accelerations: 15 x 15 or greater, At variable times  Decelerations: None  FHR Category: Category I    TOCO:   Contraction Frequency (minutes): 2.5-5  Contraction Duration (seconds): 50-80  Contraction Intensity: Mild    Lab Results   Component Value Date    WBC 10.81 (H) 02/08/2024    HGB 11.9 02/08/2024    HCT 37.0 02/08/2024     02/08/2024     Lab Results   Component Value Date    K 4.2 02/08/2024     02/08/2024    CO2 23 02/08/2024    BUN 8 02/08/2024    CREATININE 0.64 02/08/2024    AST 21 02/08/2024    ALT 10 02/08/2024     Prenatal Labs: Reviewed      Blood type: AB+  Antibody: negative  GBS: pending  HIV: non-reactive  Rubella: immune  Syphilis IgM/IgG: non-reactive  HBsAg: non-reactive  HCAb: non-reactive  Chlamydia: negative  Gonorrhea: negative  Diabetes 1 hour screen: passed  3 hour glucose: n/a  Platelets: 227, pending admission CBC  Hgb: 11.4, pending admission CBC  >2 Midnights  INPATIENT     Signature/Title: Valentina Hutton MD  Date: 2/8/2024  Time: 10:42 AM

## 2024-02-09 ENCOUNTER — ANESTHESIA (INPATIENT)
Dept: ANESTHESIOLOGY | Facility: HOSPITAL | Age: 29
DRG: 560 | End: 2024-02-09
Payer: COMMERCIAL

## 2024-02-09 ENCOUNTER — ANESTHESIA EVENT (INPATIENT)
Dept: ANESTHESIOLOGY | Facility: HOSPITAL | Age: 29
DRG: 560 | End: 2024-02-09
Payer: COMMERCIAL

## 2024-02-09 LAB
BASE EXCESS BLDCOA CALC-SCNC: -5.2 MMOL/L (ref 3–11)
BASE EXCESS BLDCOV CALC-SCNC: -2.7 MMOL/L (ref 1–9)
DME PARACHUTE DELIVERY DATE ACTUAL: NORMAL
DME PARACHUTE DELIVERY DATE REQUESTED: NORMAL
DME PARACHUTE ITEM DESCRIPTION: NORMAL
DME PARACHUTE ORDER STATUS: NORMAL
DME PARACHUTE SUPPLIER NAME: NORMAL
DME PARACHUTE SUPPLIER PHONE: NORMAL
HCO3 BLDCOA-SCNC: 24.8 MMOL/L (ref 17.3–27.3)
HCO3 BLDCOV-SCNC: 24.1 MMOL/L (ref 12.2–28.6)
O2 CT VFR BLDCOA CALC: 6.2 ML/DL
OXYHGB MFR BLDCOA: 26.3 %
OXYHGB MFR BLDCOV: 37.7 %
PCO2 BLDCOA: 66.8 MM[HG] (ref 30–60)
PCO2 BLDCOV: 49.6 MM HG (ref 27–43)
PH BLDCOA: 7.19 [PH] (ref 7.23–7.43)
PH BLDCOV: 7.3 [PH] (ref 7.19–7.49)
PO2 BLDCOA: 16.7 MM HG (ref 5–25)
PO2 BLDCOV: 19.1 MM HG (ref 15–45)
SAO2 % BLDCOV: 8 ML/DL

## 2024-02-09 PROCEDURE — 88307 TISSUE EXAM BY PATHOLOGIST: CPT | Performed by: PATHOLOGY

## 2024-02-09 PROCEDURE — NC001 PR NO CHARGE: Performed by: OBSTETRICS & GYNECOLOGY

## 2024-02-09 PROCEDURE — 59409 OBSTETRICAL CARE: CPT | Performed by: OBSTETRICS & GYNECOLOGY

## 2024-02-09 PROCEDURE — 82805 BLOOD GASES W/O2 SATURATION: CPT

## 2024-02-09 PROCEDURE — 0KQM0ZZ REPAIR PERINEUM MUSCLE, OPEN APPROACH: ICD-10-PCS | Performed by: OBSTETRICS & GYNECOLOGY

## 2024-02-09 RX ORDER — ROPIVACAINE HYDROCHLORIDE 2 MG/ML
INJECTION, SOLUTION EPIDURAL; INFILTRATION; PERINEURAL CONTINUOUS PRN
Status: DISCONTINUED | OUTPATIENT
Start: 2024-02-09 | End: 2024-02-09 | Stop reason: HOSPADM

## 2024-02-09 RX ORDER — OXYTOCIN/RINGER'S LACTATE 30/500 ML
250 PLASTIC BAG, INJECTION (ML) INTRAVENOUS CONTINUOUS
Status: ACTIVE | OUTPATIENT
Start: 2024-02-09 | End: 2024-02-09

## 2024-02-09 RX ORDER — DIPHENHYDRAMINE HCL 25 MG
25 TABLET ORAL EVERY 6 HOURS PRN
Status: DISCONTINUED | OUTPATIENT
Start: 2024-02-09 | End: 2024-02-10 | Stop reason: HOSPADM

## 2024-02-09 RX ORDER — ONDANSETRON 2 MG/ML
4 INJECTION INTRAMUSCULAR; INTRAVENOUS EVERY 8 HOURS PRN
Status: DISCONTINUED | OUTPATIENT
Start: 2024-02-09 | End: 2024-02-10 | Stop reason: HOSPADM

## 2024-02-09 RX ORDER — SIMETHICONE 80 MG
80 TABLET,CHEWABLE ORAL 4 TIMES DAILY PRN
Status: DISCONTINUED | OUTPATIENT
Start: 2024-02-09 | End: 2024-02-10 | Stop reason: HOSPADM

## 2024-02-09 RX ORDER — IBUPROFEN 600 MG/1
600 TABLET ORAL EVERY 6 HOURS
Status: DISCONTINUED | OUTPATIENT
Start: 2024-02-09 | End: 2024-02-10 | Stop reason: HOSPADM

## 2024-02-09 RX ORDER — CALCIUM CARBONATE 500 MG/1
1000 TABLET, CHEWABLE ORAL DAILY PRN
Status: DISCONTINUED | OUTPATIENT
Start: 2024-02-09 | End: 2024-02-10 | Stop reason: HOSPADM

## 2024-02-09 RX ORDER — LIDOCAINE HYDROCHLORIDE 10 MG/ML
2 INJECTION, SOLUTION EPIDURAL; INFILTRATION; INTRACAUDAL; PERINEURAL ONCE
Status: DISCONTINUED | OUTPATIENT
Start: 2024-02-09 | End: 2024-02-10

## 2024-02-09 RX ORDER — DOCUSATE SODIUM 100 MG/1
100 CAPSULE, LIQUID FILLED ORAL 2 TIMES DAILY
Status: DISCONTINUED | OUTPATIENT
Start: 2024-02-09 | End: 2024-02-10 | Stop reason: HOSPADM

## 2024-02-09 RX ORDER — ACETAMINOPHEN 325 MG/1
650 TABLET ORAL EVERY 4 HOURS PRN
Status: DISCONTINUED | OUTPATIENT
Start: 2024-02-09 | End: 2024-02-10 | Stop reason: HOSPADM

## 2024-02-09 RX ORDER — BENZOCAINE/MENTHOL 6 MG-10 MG
1 LOZENGE MUCOUS MEMBRANE DAILY PRN
Status: DISCONTINUED | OUTPATIENT
Start: 2024-02-09 | End: 2024-02-10 | Stop reason: HOSPADM

## 2024-02-09 RX ORDER — LIDOCAINE HYDROCHLORIDE AND EPINEPHRINE 15; 5 MG/ML; UG/ML
INJECTION, SOLUTION EPIDURAL
Status: COMPLETED | OUTPATIENT
Start: 2024-02-09 | End: 2024-02-09

## 2024-02-09 RX ADMIN — BENZOCAINE AND LEVOMENTHOL 1 APPLICATION: 200; 5 SPRAY TOPICAL at 08:58

## 2024-02-09 RX ADMIN — DOCUSATE SODIUM 100 MG: 100 CAPSULE, LIQUID FILLED ORAL at 17:44

## 2024-02-09 RX ADMIN — IBUPROFEN 600 MG: 600 TABLET, FILM COATED ORAL at 21:46

## 2024-02-09 RX ADMIN — IBUPROFEN 600 MG: 600 TABLET, FILM COATED ORAL at 08:58

## 2024-02-09 RX ADMIN — SODIUM CHLORIDE, SODIUM LACTATE, POTASSIUM CHLORIDE, AND CALCIUM CHLORIDE 125 ML/HR: .6; .31; .03; .02 INJECTION, SOLUTION INTRAVENOUS at 00:30

## 2024-02-09 RX ADMIN — ROPIVACAINE HYDROCHLORIDE 10 ML/HR: 2 INJECTION EPIDURAL; INFILTRATION; PERINEURAL at 00:29

## 2024-02-09 RX ADMIN — IBUPROFEN 600 MG: 600 TABLET, FILM COATED ORAL at 14:57

## 2024-02-09 RX ADMIN — DOCUSATE SODIUM 100 MG: 100 CAPSULE, LIQUID FILLED ORAL at 08:58

## 2024-02-09 RX ADMIN — ROPIVACAINE HYDROCHLORIDE: 2 INJECTION, SOLUTION EPIDURAL; INFILTRATION at 00:30

## 2024-02-09 RX ADMIN — LIDOCAINE HYDROCHLORIDE AND EPINEPHRINE 5 ML: 15; 5 INJECTION, SOLUTION EPIDURAL at 00:28

## 2024-02-09 RX ADMIN — ACETAMINOPHEN 325MG 650 MG: 325 TABLET ORAL at 17:44

## 2024-02-09 NOTE — PLAN OF CARE
Problem: POSTPARTUM  Goal: Experiences normal postpartum course  Description: INTERVENTIONS:  - Monitor maternal vital signs  - Assess uterine involution and lochia  Outcome: Progressing  Goal: Appropriate maternal -  bonding  Description: INTERVENTIONS:  - Identify family support  - Assess for appropriate maternal/infant bonding   -Encourage maternal/infant bonding opportunities  - Referral to  or  as needed  Outcome: Progressing  Goal: Establishment of infant feeding pattern  Description: INTERVENTIONS:  - Assess breast/bottle feeding  - Refer to lactation as needed  Outcome: Progressing

## 2024-02-09 NOTE — ANESTHESIA PROCEDURE NOTES
Epidural Block    Patient location during procedure: OB/L&D  Start time: 2/9/2024 12:21 AM  Reason for block: procedure for pain  Staffing  Performed by: Salvatore Whitten DO  Authorized by: Salvatore Whitten DO    Preanesthetic Checklist  Completed: patient identified, IV checked, site marked, risks and benefits discussed, surgical consent, monitors and equipment checked, pre-op evaluation and timeout performed  Epidural  Patient position: sitting  Prep: ChloraPrep  Sedation Level: no sedation  Patient monitoring: frequent blood pressure checks, continuous pulse oximetry and heart rate  Approach: midline  Location: lumbar, L3-4  Injection technique: MARCELA saline  Needle  Needle type: Tuohy   Needle gauge: 18 G  Needle insertion depth: 8 cm  Catheter type: multi-orifice  Catheter size: 20 G  Catheter at skin depth: 6 cm  Catheter securement method: tape  Test dose: negativelidocaine-epinephrine (XYLOCAINE-MPF/EPINEPHRINE) 1.5 %-1:200,000 injection 3 mL - Epidural   5 mL - 2/9/2024 12:28:00 AM  Assessment  Sensory level: T10  Number of attempts: 1negative aspiration for CSF, negative aspiration for heme and no paresthesia on injection  patient tolerated the procedure well with no immediate complications

## 2024-02-09 NOTE — DISCHARGE SUMMARY
Obstetrics Discharge Summary  Adriana Meza 28 y.o. female MRN: [id number]  Unit/Bed#: L&D 326-01 Encounter: [id number]    Admission Date: 2024     Discharge Date: 2/10/24    Admitting Attending: Jace  Delivery Attending: Jace  Discharging Attending: Dr. Campbell    Admitting Diagnoses:   1. Pregnancy at 37w  2. Chronic hypertension with superimposed preeclampsia without severe features  3. Uterine fibroids  4. Obesity    Discharge Diagnoses:   Same, delivered  S/p depo provera for contraception     Delivery  Route of Delivery: Vaginal, Spontaneous     Anesthesia: Epidural ,   QBL: 154 mL    Delivery: Vaginal, Spontaneous  at 2024  6:05 AM   Laceration: Perineal: 2°  Repaired? Yes     Baby's Weight:  ;      Apgar scores: 8  and 9  at 1 and 5 minutes, respectively      Hospital course: Adriana Meza is a 28 y.o. now . She presented to labor and delivery for induction of labor secondary to chronic hypertension with superimposed preeclampsia without severe features. A bray balloon was placed. Pitocin was started. Amniotomy was performed for clear fluid. An epidural was placed for analgesia. She progressed to complete cervical dilation and began pushing.   Her post-delivery course was uncomplicated. Her postpartum pain was well controlled with oral analgesics. Maternal blood type is AB positive so RhoGAM was not indicated.    On day of discharge, she was ambulating and able to reasonably perform all ADLs. She was voiding and had appropriate bowel function. Pain was well controlled. She was discharged home on postpartum day #2 without complications. cHTN with SI preeclampsia - enrolled in OP PPBP monitoring program.  Patient was instructed to follow up with her OBGYN as an outpatient and was given appropriate warnings to call provider if she develops signs of infection or uncontrolled pain.    Complications: none apparent    Condition at discharge: good     Provisions for  Follow-Up Care:  Please see after visit summary for information related to follow-up care and any pertinent home health orders.      Disposition: Home with precautions and OP PPBP monitoring     Planned Readmission: No    Discharge Medications:   Please see AVS for a complete list of discharge medications.    Discharge instructions :   Please see AVS for complete discharge instructions.    DANIELLE Dos Santos

## 2024-02-09 NOTE — OB LABOR/OXYTOCIN SAFETY PROGRESS
Oxytocin Safety Progress Check Note - Adriana CortorrealTorres 28 y.o. female MRN: [id number]    Unit/Bed#: L&D 326-01 Encounter: [id number]    Dose (helio-units/min) Oxytocin: 4 helio-units/min  Contraction Frequency (minutes): 3-5  Contraction Intensity: Mild/Moderate  Uterine Activity Characteristics: Irregular  Cervical Dilation: 4        Cervical Effacement: 70  Fetal Station: -2  Baseline Rate (FHR): 145 bpm  Fetal Heart Rate (FHT): 150 BPM  FHR Category: 1               Vital Signs:   Vitals:    02/08/24 1818   BP: 140/74   Pulse: 82   Resp:    Temp:    SpO2:        Notes/comments:   Patient comfortable overall. SVE as above. Continue pitocin titration. Plan for AROM on next check.     Gilberto Silva MD 2/8/2024 8:24 PM

## 2024-02-09 NOTE — OB LABOR/OXYTOCIN SAFETY PROGRESS
Oxytocin Safety Progress Check Note - Adriana CortorrealTorres 28 y.o. female MRN: [id number]    Unit/Bed#: L&D 326-01 Encounter: [id number]    Dose (helio-units/min) Oxytocin: 10 helio-units/min  Contraction Frequency (minutes): 3-5  Contraction Intensity: Mild/Moderate  Uterine Activity Characteristics: Irregular  Cervical Dilation: 4        Cervical Effacement: 70  Fetal Station: -2  Baseline Rate (FHR): 145 bpm  Fetal Heart Rate (FHT): 150 BPM  FHR Category: 1               Vital Signs:   Vitals:    02/08/24 2218   BP: 124/66   Pulse: 79   Resp:    Temp:    SpO2:        Notes/comments:   Patient comfortable but feeling contractions. Does not want her epidural just yet. AROM for clear fluid. SVE as above.   Dr. Mccormick aware    Gilberto Silva MD 2/8/2024 10:33 PM

## 2024-02-09 NOTE — OB LABOR/OXYTOCIN SAFETY PROGRESS
Oxytocin Safety Progress Check Note - Adriana CortorrealTorres 28 y.o. female MRN: [id number]    Unit/Bed#: L&D 326-01 Encounter: [id number]    Dose (helio-units/min) Oxytocin: 12 helio-units/min  Contraction Frequency (minutes): 3-4  Contraction Intensity: Mild/Moderate  Uterine Activity Characteristics: Irregular  Cervical Dilation: 4        Cervical Effacement: 70  Fetal Station: -2  Baseline Rate (FHR): 145 bpm  Fetal Heart Rate (FHT): 150 BPM  FHR Category: 1               Vital Signs:   Vitals:    02/09/24 0057   BP: 137/63   Pulse: 69   Resp:    Temp:    SpO2:        Notes/comments:   Patient comfortable with epidural. Continue pitocin titration. Will reassess as needed. SVE deferred.     Gilberto Silva MD 2/9/2024 1:40 AM

## 2024-02-09 NOTE — ANESTHESIA PREPROCEDURE EVALUATION
Procedure:  LABOR ANALGESIA    Relevant Problems   CARDIO   (+) CHTN with superimposed preeclampsia      GYN   (+) 37 weeks gestation of pregnancy        Physical Exam    Airway    Mallampati score: II  TM Distance: >3 FB  Neck ROM: full     Dental       Cardiovascular  Rhythm: regular, Rate: normal, Cardiovascular exam normal    Pulmonary  Pulmonary exam normal Breath sounds clear to auscultation    Other Findings  post-pubertal.      Anesthesia Plan  ASA Score- 2     Anesthesia Type- epidural with ASA Monitors.         Additional Monitors:     Airway Plan:            Plan Factors-Exercise tolerance (METS): >4 METS.    Chart reviewed.   Existing labs reviewed.     Patient is not a current smoker.      Obstructive sleep apnea risk education given perioperatively.        Induction-     Postoperative Plan-     Informed Consent- Anesthetic plan and risks discussed with patient.

## 2024-02-09 NOTE — OB LABOR/OXYTOCIN SAFETY PROGRESS
Oxytocin Safety Progress Check Note - Adriana CortorrealTorres 28 y.o. female MRN: [id number]    Unit/Bed#: L&D 326-01 Encounter: [id number]    Dose (helio-units/min) Oxytocin: 18 helio-units/min  Contraction Frequency (minutes): 2-4  Contraction Intensity: Mild/Moderate  Uterine Activity Characteristics: Irregular  Cervical Dilation: 10  Dilation Complete Date: 02/09/24  Dilation Complete Time: 0535  Cervical Effacement: 100  Fetal Station: 2  Baseline Rate (FHR): 145 bpm  Fetal Heart Rate (FHT): 140 BPM  FHR Category: 2               Vital Signs:   Vitals:    02/09/24 0453   BP: 143/63   Pulse: 88   Resp:    Temp:    SpO2:        Notes/comments:   FHT showing deeper variable decelerations. Patient resting comfortably. SVE now complete. Will begin pushing shortly.     Gilberto Silva MD 2/9/2024 5:36 AM

## 2024-02-09 NOTE — LACTATION NOTE
This note was copied from a baby's chart.  CONSULT - LACTATION  Baby Girl (Omid Meza 0 days female MRN: [id number]    [facility] NURSERY Room / Bed: L&D 306(N)/L&D 306(N) Encounter: [id number]    Maternal Information     MOTHER:  Adriana Meza  Maternal Age: 28 y.o.   OB History: # 1 - Date: , Sex: None, Weight: None, GA: 8w0d, Delivery: None, Apgar1: None, Apgar5: None, Living: None, Birth Comments: None    # 2 - Date: 24, Sex: Female, Weight: 3295 g (7 lb 4.2 oz), GA: 37w1d, Delivery: Vaginal, Spontaneous, Apgar1: 8, Apgar5: 9, Living: Living, Birth Comments: None   Previouse breast reduction surgery? No    Lactation history:   Has patient previously breast fed: No   How long had patient previously breast fed:     Previous breast feeding complications:       Past Surgical History:   Procedure Laterality Date    NO PAST SURGERIES          Birth information:  YOB: 2022   Time of birth: 6:05 AM   Sex: female   Delivery type: Vaginal, Spontaneous   Birth Weight: 3295 g (7 lb 4.2 oz)   Percent of Weight Change: 0%     Gestational Age: 37w1d   [unfilled]    Assessment     Breast and nipple assessment:  slightly wide spaced, slightly conical shape, soft    [place] Assessment:  tight jaw, slight cleft in tongue tip, reluctant to suck examiners finger,  poor cupping, partial peristalsis, extends tongue to lower alveolar ridge only, unable to assess lateralization or under tongue.    Feeding assessment: latch difficulty (unable to achieve a deep latch or sustain a latch for more than a few sucks)  LATCH:  Latch: Repeated attempts, hold nipple in mouth, stimulate to suck   Audible Swallowing: A few with stimulation   Type of Nipple: Everted (After stimulation)   Comfort (Breast/Nipple): Soft/non-tender   Hold (Positioning): Partial assist, teach one side, mother does other, staff holds   LATCH Score: 7        Feeding recommendations:   breast feed on demand    Worked on positioning infant up at chest level and starting to feed infant with nose arriving at the nipple. Then, using areolar compression to achieve a deep latch that is comfortable and exchanges optimum amounts of milk. I offered suggestions on positioning, for a more optimal latch, showed mom proper positioning, ear, shoulder hip in line, baby's arms open, not in between mom and baby, nose to nipple, hand at base of head/neck.    Adriana was able to hand express large drops of colostrum into baby's mouth.    Offer the breast as desired, following baby's feeding cues, keeping the attempts short and stop at signs of frustration.  Spend lots of time skin to skin.  Adriana desires to begin pumping, encouraged her to pump if baby does not latch or feed well at the breast, or when supplementing. Continue to supplement as needed, offer expressed breast milk prior to formula.     Instructions given on pumping.  Discussed when to start, frequency, different pumps available versus manual expression.    Discussed hygiene of hands and supplies as well as assembly, placement of flange (inserts given), size of flanged, preparing the breast and cycles and suction settings on pump.    Demonstrated use of hand pump.    Discussed labeling of milk, storage, and preparation of stored milk.    Reviewed how to implement hands on pumping and encouraged her to do so with each pumping session and hand express after.     Other feeding methods offered, cup, spoon, finger feeding, mom declines, paced bottle feeding reviewed as well as appropriate volumes per age.     Met with mother. Provided mother with Ready, Set, Baby booklet which contained information on:  Hand expression with access to QR codes to review hand expression.  Positioning and latch reviewed as well as showing images of other feeding positions.  Discussed the properties of a good latch in any position.   Feeding on cue and what that means for  recognizing infant's hunger, s/s that baby is getting enough milk and some s/s that breastfeeding dyad may need further help  Skin to Skin contact and benefits to mom and baby  Avoidance of pacifiers for the first month discussed.   Gave information on common concerns, what to expect the first few weeks after delivery, preparing for other caregivers, and how partners can help. Resources for support also provided.    Encouraged family to call for assistance as needed,  Consider follow up with [facility] if latch not improving prior to DC.    Language line  # [id number] used for consult and education.     Danielle Hardin, RN 2/9/2024 4:13 PM

## 2024-02-09 NOTE — ANESTHESIA POSTPROCEDURE EVALUATION
"Post-Op Assessment Note    CV Status:  Stable    Pain management: adequate      Post-op block assessment: catheter intact and no complications   Mental Status:  Alert and awake   Hydration Status:  Euvolemic   PONV Controlled:  Controlled   Airway Patency:  Patent     Post Op Vitals Reviewed: Yes    No anethesia notable event occurred.    Staff: Anesthesiologist               BP      Temp      Pulse     Resp      SpO2      /72   Pulse 92   Temp 98.2 °F (36.8 °C)   Resp 18   Ht 5' 8\" (1.727 m)   Wt 116 kg (256 lb)   LMP 05/25/2023 (Exact Date)   SpO2 99%   Breastfeeding Unknown   BMI 38.92 kg/m²     "

## 2024-02-09 NOTE — OB LABOR/OXYTOCIN SAFETY PROGRESS
Oxytocin Safety Progress Check Note - Adriana CortorrealTorres 28 y.o. female MRN: [id number]    Unit/Bed#: L&D 326-01 Encounter: [id number]    Dose (helio-units/min) Oxytocin: 18 helio-units/min  Contraction Frequency (minutes): 2-4  Contraction Intensity: Mild/Moderate  Uterine Activity Characteristics: Irregular  Cervical Dilation: 4        Cervical Effacement: 70  Fetal Station: -2  Baseline Rate (FHR): 135 bpm  Fetal Heart Rate (FHT): 140 BPM  FHR Category: 1               Vital Signs:   Vitals:    02/09/24 0253   BP: 133/65   Pulse: 77   Resp:    Temp:    SpO2:        Notes/comments:   Patient resting comfortably. Continue pitocin titration. Will reassess as needed. SVE deferred     Gilberto Silva MD 2/9/2024 3:17 AM

## 2024-02-09 NOTE — L&D DELIVERY NOTE
OBGYN Vaginal Delivery Summary  Adriana Meza 28 y.o. female MRN: [id number]  Unit/Bed#: L&D 326-01 Encounter: [id number]    Predelivery Diagnosis:  1. Pregnancy at 37w  2.  Chronic hypertension with superimposed preeclampsia without severe features  3. Uterine fibroids  4. Obesity    Postdelivery Diagnosis:  1. Same as above  2. Delivery of term     Procedure: spontaneous vaginal delivery, repair of 2nd degree laceration(s)    Attending: Dr. Norma Mccormick    Assistant: Dr. Gilberto Silva    Anesthesia: Epidural    QBL: 154 mL  Admission H.9 g/dL  Admission platelets: 247 thousands/uL    Complications: none apparent    Specimens: cord blood, arterial and venous cord blood gases, placenta to pathology    Findings:   1. Viable female at 0605, with APGARS of 8 and 9 at 1 and 5 minutes respectively. Weight pending at time of dictation.  2. Spontaneous delivery of intact placenta at 0609. central insertion 3 vessel umbilical cord  3. 2nd degree laceration repaired with 3-0 vicryl  4. Blood gases:  Umbilical Cord Venous Blood Gas:  Results from last 7 days   Lab Units 24  0606   PH COV  7.305   PCO2 COV mm HG 49.6*   HCO3 COV mmol/L 24.1   BASE EXC COV mmol/L -2.7*   O2 CT CD VB mL/dL 8.0   O2 HGB, VENOUS CORD % 37.7     Umbilical Cord Arterial Blood Gas:  Results from last 7 days   Lab Units 24  0606   PH COA  7.187*   PCO2 COA  66.8*   PO2 COA mm HG 16.7   HCO3 COA mmol/L 24.8   BASE EXC COA mmol/L -5.2*   O2 CONTENT CORD ART ml/dl 6.2   O2 HGB, ARTERIAL CORD % 26.3       Disposition:  Patient tolerated the procedure well and was recovering in labor and delivery room.    Brief history and labor course:  Adriana Meza is a 28 y.o.  at 37wk0d. She presented to labor and delivery for induction of labor secondary to chronic hypertension with superimposed preeclampsia. A bray balloon was placed. Pitocin was started. Amniotomy was performed for clear fluid. An epidural  was placed for analgesia. She progressed to complete cervical dilation and began pushing.     Description of procedure  After pushing for 18 minutes, the patient delivered a viable female  at 0605 on 24, weight pending at time of dictation, apgars of 8 (1 min) and 9 (5 min). The fetal vertex delivered spontaneously. Baby restituted  to BLAKE. There was a nuchal cord which was reduced. The anterior (right) shoulder delivered atraumatically with maternal expulsive forces and the assistance of gentle downward traction. The posterior shoulder delivered with maternal expulsive forces and the assistance of gentle upward traction. The remainder of the fetus delivered spontaneously.     Upon delivery the infant was placed on the mother's abdomen and delayed cord clamping was performed. The umbilical cord was then doubly clamped and cut. The infant was noted to cry spontaneously and was moving all extremities appropriately. There was no evidence for injury. Awaiting nurse resuscitators evaluated the . Arterial and venous cord blood gases and cord blood were collected for analysis and were promptly sent to the lab. In the immediate post-partum, IV pitocin was administered, and the uterus was noted to contract down well with massage and pitocin. The placenta delivered spontaneously at 0609 and was noted to be intact and had a centrally inserted 3 vessel cord. The placenta was sent to pathology.    The vagina, cervix, perineum, and rectum were inspected. 2nd degree laceration(s) were noted. The apex of the vaginal laceration was identified and a suture of 3-0 Vicryl was placed 1cm above the apex. The vaginal mucosa and underlying rectovaginal fascia were closed using a running locked suture to the hymenal ring. The suture was then brought underneath the hymenal ring. A stitch was then placed through the bulbocavernosus muscle. Continuing with the same suture, the transverse perineal muscles were re-approximated.  The suture was brought to the posterior apex of the skin laceration and then the skin was re-approximated in a subcuticular fashion to the hymenal ring. Hemostasis was achieved.  .    At the conclusion of the procedure, all needle, sponge, and instrument counts were noted to be correct. Patient tolerated the procedure well and was allowed to recover in labor and delivery room with family and  before being transferred to the post-partum floor.     Dr. Mccormick was present and participated in all key portions of the case.    Gilberto Silva MD  2024  6:28 AM

## 2024-02-09 NOTE — ANESTHESIA PROCEDURE NOTES
Peripheral Block    Reason for block: at surgeon's request and post-op pain management  Staffing  Performed by: Salvatore Whitten DO  Authorized by: Salvatore Whitten DO

## 2024-02-10 VITALS
SYSTOLIC BLOOD PRESSURE: 147 MMHG | WEIGHT: 256 LBS | RESPIRATION RATE: 16 BRPM | TEMPERATURE: 97 F | BODY MASS INDEX: 38.8 KG/M2 | DIASTOLIC BLOOD PRESSURE: 74 MMHG | HEIGHT: 68 IN | HEART RATE: 77 BPM | OXYGEN SATURATION: 99 %

## 2024-02-10 PROCEDURE — 99024 POSTOP FOLLOW-UP VISIT: CPT | Performed by: OBSTETRICS & GYNECOLOGY

## 2024-02-10 RX ORDER — MEDROXYPROGESTERONE ACETATE 150 MG/ML
150 INJECTION, SUSPENSION INTRAMUSCULAR ONCE
Qty: 1 ML | Refills: 0 | Status: SHIPPED | OUTPATIENT
Start: 2024-02-10 | End: 2024-02-10

## 2024-02-10 RX ORDER — MEDROXYPROGESTERONE ACETATE 150 MG/ML
150 INJECTION, SUSPENSION INTRAMUSCULAR ONCE
Status: COMPLETED | OUTPATIENT
Start: 2024-02-10 | End: 2024-02-10

## 2024-02-10 RX ORDER — IBUPROFEN 600 MG/1
600 TABLET ORAL EVERY 6 HOURS
Start: 2024-02-10

## 2024-02-10 RX ORDER — ACETAMINOPHEN 325 MG/1
650 TABLET ORAL EVERY 4 HOURS PRN
Start: 2024-02-10

## 2024-02-10 RX ADMIN — IBUPROFEN 600 MG: 600 TABLET, FILM COATED ORAL at 04:00

## 2024-02-10 RX ADMIN — DOCUSATE SODIUM 100 MG: 100 CAPSULE, LIQUID FILLED ORAL at 09:09

## 2024-02-10 RX ADMIN — MEDROXYPROGESTERONE ACETATE 150 MG: 150 INJECTION, SUSPENSION, EXTENDED RELEASE INTRAMUSCULAR at 12:21

## 2024-02-10 RX ADMIN — IBUPROFEN 600 MG: 600 TABLET, FILM COATED ORAL at 09:09

## 2024-02-10 NOTE — CASE MANAGEMENT
Case Management Progress Note    Patient name Adriana Meza  Location L&D 306/L&D 306-01 MRN [id number]  : 1995 Date 2/10/2024       LOS (days): 2  Geometric Mean LOS (GMLOS) (days):   Days to GMLOS:        OBJECTIVE:        Current admission status: Inpatient  Preferred Pharmacy:   [facility] [address] [place] [address]  [address] [place] [address]  Phone: [phone] Fax: [phone]    Primary Care Provider: No primary care provider on file.    Primary Insurance: FORVM  Secondary Insurance:     PROGRESS NOTE:      CM received consult for breast pump. MOB requesting Zomee Z2 pump. CM placed order via [employer]. Order approved. CM delivered pump to MOB's room. Delivery ticket signed and placed in bin for DME liaison.  Patient also states she has trouble affording food. CM provided food resources for patient.

## 2024-02-10 NOTE — LACTATION NOTE
This note was copied from a baby's chart.     02/10/24 1107   Lactation Consultation   Reason for Consult 10 m   Lactation Consultant Total Time 10   Risk Factors Language barrier   Breasts/Nipples   Breastfeeding Status Yes   Breastfeeding Progress Not yet established;Breastfeeding well   Other OB Lactation Tools   Feeding Devices Bottle   Patient Follow-Up   Lactation Consult Status 1   Follow-Up Type Inpatient;Call as needed   Other OB Lactation Documentation    Additional Problem Noted Mother continues to breastfeed and give formula (about 10-11ml). Mother stated she does not have alot of colostrum and is concerned about breastfeeding. She has been feeding baby for about 15 minutes every 3 hours. Education completed on colostrum milk benefits and times it takes to transition to mature milk. Baby prefers Right side per mother since she is producing more on that side. Education on alternating sides and still allwoing baby to feed on both despite not seeing alot come out. Encouraged mother to continue putting baby to breast which will aid in milk production. Encouraged mother to call to check a latch.      used to give all information: Agent [id number] (ID:[id number])    Informed mother the more she breastfeeds, the less she may need to use formula. Mother plans to breast and formula feed.     Mother has the RSB and Discharge booklet at her bedside for review at her leisure and to call with any questions. Emphasized [facility] and [facility] center for additional help once discharged.     Encouraged mother to call the next time baby eats to check latch.     Encouraged mother to call for any additional assistance, questions, or concerns about breastfeeding.  Extension provided.

## 2024-02-10 NOTE — PROGRESS NOTES
Discharge teaching completed for mom and baby.  Stressed car seat safety, shaken baby and safe sleep.  Save your life magnet given and explained, appropriate questions asked and answered.  Mom verbalized understand.

## 2024-02-10 NOTE — PROGRESS NOTES
Progress Note - OB/GYN  Post-Partum Physician Note   Adriana YogeshSun 28 y.o. female MRN: [id number]  Unit/Bed#: L&D 306-01 Encounter: [id number]    Patient is post-partum  day 1 from a Spontaneous vaginal delivery.     Brief OB review:  Pregnancy @ 37.1 gestational weeks complicated by uterine fibroids, obesity and  cHTN with superimposed preeclampsia.     Pain: yes; minimal cramping  Tolerating Oral Intake: yes  Voiding: yes  Flatus: yes  Bowel Movement: no  Ambulating: yes  Breastfeeding: Breastfeeding  Chest Pain: no  Shortness of Breath: no  Leg Pain/Discomfort: no  Lochia: moderate  Other: bonding well     Objective:   Vitals:    02/10/24 0751   BP: 147/74   Pulse: 77   Resp: 16   Temp: (!) 97 °F (36.1 °C)   SpO2:        Intake/Output Summary (Last 24 hours) at 2/10/2024 0908  Last data filed at 2/9/2024 1201  Gross per 24 hour   Intake --   Output 550 ml   Net -550 ml       Physical Exam:  General: in no apparent distress and well developed and well nourished  Cardiovascular: Cor RRR  Lungs: clear to auscultation bilaterally  Abdomen: abdomen is soft without significant tenderness, masses, organomegaly or guarding  Fundus: Firm and non-tender, 2 below the umbilicus  Lower extremeties: nontender; +2 edema     Labs/Tests:   NA      MEDS:   Current Facility-Administered Medications   Medication Dose Route Frequency    acetaminophen (TYLENOL) tablet 650 mg  650 mg Oral Q4H PRN    benzocaine-menthol-lanolin-aloe (DERMOPLAST) 20-0.5 % topical spray 1 Application  1 Application Topical Q6H PRN    calcium carbonate (TUMS) chewable tablet 1,000 mg  1,000 mg Oral Daily PRN    diphenhydrAMINE (BENADRYL) tablet 25 mg  25 mg Oral Q6H PRN    docusate sodium (COLACE) capsule 100 mg  100 mg Oral BID    hydrocortisone 1 % cream 1 Application  1 Application Topical Daily PRN    ibuprofen (MOTRIN) tablet 600 mg  600 mg Oral Q6H    medroxyPROGESTERone (DEPO-PROVERA) IM injection 150 mg  150 mg Intramuscular Once     ondansetron (ZOFRAN) injection 4 mg  4 mg Intravenous Q8H PRN    simethicone (MYLICON) chewable tablet 80 mg  80 mg Oral 4x Daily PRN    witch hazel-glycerin (TUCKS) topical pad 1 Pad  1 Pad Topical Q4H PRN     Invasive Devices       Peripheral Intravenous Line  Duration             Peripheral IV 24 Right Forearm 2 days                    Assessment and Plan:  28 y.o. year-old , postpartum day 1 status-post     delivery    Continue routine postpartum care  Encourage ambulation  Advance diet as tolerated  Planning depo provera for PP contraception. Will administer prior to DC   Anticipate DC tomorrow

## 2024-02-10 NOTE — PLAN OF CARE
Problem: POSTPARTUM  Goal: Experiences normal postpartum course  Description: INTERVENTIONS:  - Monitor maternal vital signs  - Assess uterine involution and lochia  2/10/2024 124 by Neris Rebolledo RN  Outcome: Adequate for Discharge  2/10/2024 0825 by Neris Rebolledo RN  Outcome: Progressing  Goal: Appropriate maternal -  bonding  Description: INTERVENTIONS:  - Identify family support  - Assess for appropriate maternal/infant bonding   -Encourage maternal/infant bonding opportunities  - Referral to  or  as needed  2/10/2024 1242 by Neris Rebolledo RN  Outcome: Adequate for Discharge  2/10/2024 0825 by Neris Rebolledo RN  Outcome: Progressing  Goal: Establishment of infant feeding pattern  Description: INTERVENTIONS:  - Assess breast/bottle feeding  - Refer to lactation as needed  2/10/2024 124 by Neris Rebolledo RN  Outcome: Adequate for Discharge  2/10/2024 0825 by Neris Rebolledo RN  Outcome: Progressing

## 2024-02-12 PROCEDURE — 88307 TISSUE EXAM BY PATHOLOGIST: CPT | Performed by: PATHOLOGY

## 2024-02-12 NOTE — UTILIZATION REVIEW
"Mother and baby discharged on 02/10/2024         NOTIFICATION OF INPATIENT ADMISSION   MATERNITY/DELIVERY AUTHORIZATION REQUEST   SERVICING FACILITY:   [facility] - L&D, , NICU  [address] [place] [address]  Tax ID: [id number]  NPI: [id number] ATTENDING PROVIDER:  Attending Name and NPI#: Norma Mccormick Md [[id number]]  Address: [address] [place] [address]  Phone: [phone]     ADMISSION INFORMATION:  Place of Service: Inpatient [facility]  Place of Service Code: 21  Inpatient Admission Date/Time: 24  8:10 AM  Discharge Date/Time: 2/10/2024  2:00 PM  Admitting Diagnosis Code/Description:  Encounter for induction of labor [Z34.90]   Mother: Adriana Meza 1995 Estimated Date of Delivery: 24  Delivering clinician:     OB History          2    Para   1    Term   1       0    AB   1    Living   1         SAB   1    IAB   0    Ectopic   0    Multiple   0    Live Births   1                Name & MRN:   Information for the patient's :  Derrick, Baby Girl (Adriana) [[id number]]    Delivery Information:  Sex: female  Delivered 2024 6:05 AM by Vaginal, Spontaneous; Gestational Age: 37w1d    [place] Measurements:  Weight: 7 lb 4.2 oz (3295 g);  Height: 20\"    APGAR 1 minute 5 minutes 10 minutes   Totals: 8 9      [place] Birth Information: 28 y.o. female MRN: [id number] Unit/Bed#: L&D 306-01   Birthweight: No birth weight on file. Gestational Age: <None> Delivery Type:    APGARS Totals:        UTILIZATION REVIEW CONTACT:  Rosalina Azevedo, Utilization   Network Utilization Review Department  Phone: [phone]  Fax [phone]  Email: [email or web address]  Contact for approvals/pending authorizations, clinical reviews, and discharge.   PHYSICIAN ADVISORY SERVICES:  Medical Necessity Denial & Exiy-pc-Hsbu Review  Phone: [phone]  Fax: " [phone]  Email: [email or web address]   DISCHARGE SUPPORT TEAM:  For Patients Discharge Needs & Updates  Phone: [phone] opt. 2 Fax: [phone]  Email: [email or web address]

## 2024-02-15 LAB
DME PARACHUTE DELIVERY DATE ACTUAL: NORMAL
DME PARACHUTE DELIVERY DATE REQUESTED: NORMAL
DME PARACHUTE ITEM DESCRIPTION: NORMAL
DME PARACHUTE ORDER STATUS: NORMAL
DME PARACHUTE SUPPLIER NAME: NORMAL
DME PARACHUTE SUPPLIER PHONE: NORMAL

## 2024-02-29 ENCOUNTER — POSTPARTUM VISIT (OUTPATIENT)
Dept: OBGYN CLINIC | Facility: CLINIC | Age: 29
End: 2024-02-29

## 2024-02-29 VITALS
BODY MASS INDEX: 34.36 KG/M2 | WEIGHT: 226 LBS | SYSTOLIC BLOOD PRESSURE: 135 MMHG | HEART RATE: 72 BPM | DIASTOLIC BLOOD PRESSURE: 64 MMHG

## 2024-02-29 DIAGNOSIS — Z30.09 UNWANTED FERTILITY: ICD-10-CM

## 2024-02-29 PROBLEM — O34.10 UTERINE FIBROID IN PREGNANCY: Status: RESOLVED | Noted: 2023-08-30 | Resolved: 2024-02-29

## 2024-02-29 PROBLEM — D25.9 UTERINE FIBROID IN PREGNANCY: Status: RESOLVED | Noted: 2023-08-30 | Resolved: 2024-02-29

## 2024-02-29 PROBLEM — O99.210 OBESITY AFFECTING PREGNANCY: Status: RESOLVED | Noted: 2023-08-09 | Resolved: 2024-02-29

## 2024-02-29 PROBLEM — Z34.90 ENCOUNTER FOR INDUCTION OF LABOR: Status: RESOLVED | Noted: 2024-02-08 | Resolved: 2024-02-29

## 2024-02-29 RX ORDER — PRENATAL WITH FERROUS FUM AND FOLIC ACID 3080; 920; 120; 400; 22; 1.84; 3; 20; 10; 1; 12; 200; 27; 25; 2 [IU]/1; [IU]/1; MG/1; [IU]/1; MG/1; MG/1; MG/1; MG/1; MG/1; MG/1; UG/1; MG/1; MG/1; MG/1; MG/1
1 TABLET ORAL EVERY MORNING
Qty: 90 TABLET | Refills: 2 | Status: SHIPPED | OUTPATIENT
Start: 2024-02-29

## 2024-02-29 RX ORDER — MEDROXYPROGESTERONE ACETATE 150 MG/ML
150 INJECTION, SUSPENSION INTRAMUSCULAR
Qty: 1 ML | Refills: 3 | Status: SHIPPED | OUTPATIENT
Start: 2024-02-29

## 2024-02-29 NOTE — PROGRESS NOTES
POSTPARTUM VISIT    Adriana Ashton presents today for postpartum visit.  She had a  delivery on 2024.  Complications included 2nd degree perineal laceration with repair and CHTN with superimposed preeclampsia.  She is breast and bottlefeeding her infant and reports having issues with baby latching, but is already working closely with lactation counselor.  She desires Depoprovera for contraception and she received the first dose on 2/10/2024 prior to hospital discharge.  She was provided with [place] Depression Screening tool and her score was 1.    Review of Systems:   -Constitutional: denies issues, reports mild perineal pain   -Breasts: denies tenderness   -Gynecologic: lochia continues - light flow   -Urinary: denies issues urinating   -GI: stools WNL, denies issues    Physical Exam:   -Vitals:   Vitals:    24 1724   BP: 135/64   Pulse: 72   Weight: 103 kg (226 lb)      -General: A&Ox3, no acute distress noted   -Abdomen: soft, non-tender   -Extremities: nontender, no edema noted   -Breasts: deferred   -Pelvic exam: deferred    Assessment/Plan:  1. Normal postpartum exam.  2. Depression screening negative.  3. Last pap smear was done 2023 and result was NILM.  Advise return for next annual GYN exam in 2024.  4. Contraception: Depoprovera injection every 3 months.  Rx sent to her pharmacy.

## 2024-02-29 NOTE — PATIENT INSTRUCTIONS
Bernadette por bartholomew confianza en nuestro equipo.   Le agradecemos y agradecemos debi comentarios.   Si recibe rocky encuesta nuestra, tómese unos momentos para informarnos cómo estamos.   Sinceramente,  DANIELLE Rosenthal

## 2024-04-17 ENCOUNTER — TELEPHONE (OUTPATIENT)
Dept: OBGYN CLINIC | Facility: CLINIC | Age: 29
End: 2024-04-17

## 2024-05-08 ENCOUNTER — CLINICAL SUPPORT (OUTPATIENT)
Dept: OBGYN CLINIC | Facility: CLINIC | Age: 29
End: 2024-05-08

## 2024-05-08 VITALS
HEART RATE: 73 BPM | WEIGHT: 221 LBS | BODY MASS INDEX: 33.49 KG/M2 | DIASTOLIC BLOOD PRESSURE: 78 MMHG | SYSTOLIC BLOOD PRESSURE: 123 MMHG | HEIGHT: 68 IN

## 2024-05-08 DIAGNOSIS — Z59.41 FOOD INSECURITY: ICD-10-CM

## 2024-05-08 DIAGNOSIS — Z30.09 UNWANTED FERTILITY: Primary | ICD-10-CM

## 2024-05-08 PROCEDURE — 96372 THER/PROPH/DIAG INJ SC/IM: CPT

## 2024-05-08 RX ORDER — MEDROXYPROGESTERONE ACETATE 150 MG/ML
150 INJECTION, SUSPENSION INTRAMUSCULAR ONCE
Status: COMPLETED | OUTPATIENT
Start: 2024-05-08 | End: 2024-05-08

## 2024-05-08 RX ADMIN — MEDROXYPROGESTERONE ACETATE 150 MG: 150 INJECTION, SUSPENSION INTRAMUSCULAR at 15:56

## 2024-05-08 SDOH — ECONOMIC STABILITY - FOOD INSECURITY: FOOD INSECURITY: Z59.41

## 2024-05-15 ENCOUNTER — PATIENT OUTREACH (OUTPATIENT)
Dept: OBGYN CLINIC | Facility: CLINIC | Age: 29
End: 2024-05-15

## 2024-05-15 NOTE — PROGRESS NOTES
UYEN BOYLE attempted to outreach for the first time today for a SDOH call. UYEN BOYLE called x2 with the interpretor, the first time a VM was left, the second time and pts mother picked up and stated pt is not available but should be tmorrow morning. UYEN BOYLE will call back at that time.

## 2024-05-20 ENCOUNTER — PATIENT OUTREACH (OUTPATIENT)
Dept: OBGYN CLINIC | Facility: CLINIC | Age: 29
End: 2024-05-20

## 2024-05-20 NOTE — LETTER
05/20/24    Estimado/a Patrica Abbasi un coordinador de la atención médica en [facility]  [facility]  [address] [address] [phone].     patrica Gonsalez trabajadora social en [facility]. Intenté llamarte varias veces. Obrien médico me envió rocky referencia para hacer un seguimiento con usted después de obrien última luis para ofrecerle recursos comunitarios para preocupaciones con las finanzas, conseguir alimentos, rocky casa, problemas de vivienda y servicios públicos y necesidades de transporte. Si necesita recursos comunitarios, llámeme al [phone].    Atentamente.       Ximena RUSSO

## 2024-05-20 NOTE — PROGRESS NOTES
UYEN Saunders called x3 with the oohilove interpretor today, on the second call it seemed like someone picked up but then the call was disconnected. We called the third time and attempted to leave a VM but the phone did not let us. UYEN Saunders [facility]. Will close this encounter at this time.

## 2024-07-09 ENCOUNTER — VBI (OUTPATIENT)
Dept: ADMINISTRATIVE | Facility: OTHER | Age: 29
End: 2024-07-09

## 2024-07-09 NOTE — TELEPHONE ENCOUNTER
07/09/24 3:33 PM     Chart reviewed for Pap Smear (HPV) aka Cervical Cancer Screening was/were submitted to the patient's insurance.     Leonor Cifuentes   [facility]

## 2024-07-24 ENCOUNTER — CLINICAL SUPPORT (OUTPATIENT)
Dept: OBGYN CLINIC | Facility: CLINIC | Age: 29
End: 2024-07-24

## 2024-07-24 VITALS
WEIGHT: 216.8 LBS | DIASTOLIC BLOOD PRESSURE: 76 MMHG | BODY MASS INDEX: 32.86 KG/M2 | HEIGHT: 68 IN | HEART RATE: 75 BPM | SYSTOLIC BLOOD PRESSURE: 121 MMHG

## 2024-07-24 DIAGNOSIS — Z30.09 UNWANTED FERTILITY: Primary | ICD-10-CM

## 2024-07-24 PROCEDURE — 96372 THER/PROPH/DIAG INJ SC/IM: CPT

## 2024-07-24 RX ORDER — MEDROXYPROGESTERONE ACETATE 150 MG/ML
150 INJECTION, SUSPENSION INTRAMUSCULAR ONCE
Status: COMPLETED | OUTPATIENT
Start: 2024-07-24 | End: 2024-07-24

## 2024-07-24 RX ADMIN — MEDROXYPROGESTERONE ACETATE 150 MG: 150 INJECTION, SUSPENSION INTRAMUSCULAR at 16:31

## 2024-07-24 NOTE — PROGRESS NOTES
Depo given to patient in left deltoid on 7/24/2024.      NDC: [id number]  LOT: [id number]  EXP: 03/31/2028

## 2024-10-14 ENCOUNTER — CLINICAL SUPPORT (OUTPATIENT)
Dept: OBGYN CLINIC | Facility: CLINIC | Age: 29
End: 2024-10-14

## 2024-10-14 VITALS
BODY MASS INDEX: 32.25 KG/M2 | DIASTOLIC BLOOD PRESSURE: 84 MMHG | SYSTOLIC BLOOD PRESSURE: 130 MMHG | HEART RATE: 85 BPM | WEIGHT: 212.8 LBS | HEIGHT: 68 IN

## 2024-10-14 DIAGNOSIS — Z59.819 HOUSING INSTABILITY: Primary | ICD-10-CM

## 2024-10-14 DIAGNOSIS — Z30.09 UNWANTED FERTILITY: ICD-10-CM

## 2024-10-14 PROCEDURE — 96372 THER/PROPH/DIAG INJ SC/IM: CPT

## 2024-10-14 RX ORDER — MEDROXYPROGESTERONE ACETATE 150 MG/ML
150 INJECTION, SUSPENSION INTRAMUSCULAR ONCE
Status: COMPLETED | OUTPATIENT
Start: 2024-10-14 | End: 2024-10-14

## 2024-10-14 RX ADMIN — MEDROXYPROGESTERONE ACETATE 150 MG: 150 INJECTION, SUSPENSION INTRAMUSCULAR at 16:32

## 2024-10-14 SDOH — ECONOMIC STABILITY - HOUSING INSECURITY: HOUSING INSTABILITY UNSPECIFIED: Z59.819

## 2024-10-21 ENCOUNTER — PATIENT OUTREACH (OUTPATIENT)
Dept: OBGYN CLINIC | Facility: CLINIC | Age: 29
End: 2024-10-21

## 2024-10-22 NOTE — PROGRESS NOTES
UYEN BOYLE attempted to outreach pt by phone today for a SDOH referral, there was no answer, UYEN BOLYE had the [place] interpretor leave a  for a return call.

## 2024-10-28 ENCOUNTER — PATIENT OUTREACH (OUTPATIENT)
Dept: OBGYN CLINIC | Facility: CLINIC | Age: 29
End: 2024-10-28

## 2024-10-28 NOTE — PROGRESS NOTES
UYEN BOYLE outreached pt in regards to the [facility] referral that was placed for pt for housing insecurity. Pt reports that at this time she has no needs but thanked UYEN BOYLE for reaching out, UYEN BOYLE encouraged her to call the office in the future if any needs arise, will close this referral at this time.

## 2024-11-07 ENCOUNTER — ANNUAL EXAM (OUTPATIENT)
Dept: OBGYN CLINIC | Facility: CLINIC | Age: 29
End: 2024-11-07

## 2024-11-07 VITALS
DIASTOLIC BLOOD PRESSURE: 70 MMHG | BODY MASS INDEX: 32.84 KG/M2 | HEART RATE: 87 BPM | SYSTOLIC BLOOD PRESSURE: 120 MMHG | WEIGHT: 216 LBS

## 2024-11-07 DIAGNOSIS — Z23 NEED FOR HPV VACCINATION: ICD-10-CM

## 2024-11-07 DIAGNOSIS — Z12.4 SCREENING FOR CERVICAL CANCER: ICD-10-CM

## 2024-11-07 DIAGNOSIS — Z30.09 UNWANTED FERTILITY: ICD-10-CM

## 2024-11-07 DIAGNOSIS — Z01.419 ENCOUNTER FOR GYNECOLOGICAL EXAMINATION WITHOUT ABNORMAL FINDING: Primary | ICD-10-CM

## 2024-11-07 DIAGNOSIS — Z12.39 ENCOUNTER FOR BREAST CANCER SCREENING USING NON-MAMMOGRAM MODALITY: ICD-10-CM

## 2024-11-07 PROBLEM — O11.9 CHRONIC HYPERTENSION WITH SUPERIMPOSED PREECLAMPSIA: Status: RESOLVED | Noted: 2023-11-17 | Resolved: 2024-11-07

## 2024-11-07 PROCEDURE — 99395 PREV VISIT EST AGE 18-39: CPT | Performed by: NURSE PRACTITIONER

## 2024-11-07 PROCEDURE — 90471 IMMUNIZATION ADMIN: CPT | Performed by: NURSE PRACTITIONER

## 2024-11-07 PROCEDURE — 90651 9VHPV VACCINE 2/3 DOSE IM: CPT | Performed by: NURSE PRACTITIONER

## 2024-11-07 RX ORDER — MEDROXYPROGESTERONE ACETATE 150 MG/ML
150 INJECTION, SUSPENSION INTRAMUSCULAR
Qty: 1 ML | Refills: 3 | Status: SHIPPED | OUTPATIENT
Start: 2024-11-07

## 2024-11-07 NOTE — PATIENT INSTRUCTIONS
Bernadette por bartholomew confianza en nuestro equipo.   Le agradecemos y agradecemos debi comentarios.   Si recibe rocky encuesta nuestra, tómese unos momentos para informarnos cómo estamos.   Sinceramente,  CAM RosenthalNP     OBESIDAD     Obesidad es cuando bartholomew índice de masa corporal (IMC) es superior a 30. Bartholomew Body mass index is 32.84 kg/m²..    Los riesgos de la obesidad incluyen  muchos problemas de guanakito, incluidas las lesiones y la discapacidad física. Es posible que deba realizarse exámenes para detectar lo siguiente:  Diabetes     Hipertensión o colesterol altoEnfermedades del corazón     Enfermedad cardíaca     Enfermedades del hígado o de la vesícula biliar     Cáncer de colon, de pecho, de próstata, de hígado o de riñón     El apnea del sueño     Artritis o gota    Busque atención médica de inmediato si:   Usted tiene un intenso dolor de vanessa, confusión o dificultad para hablar.     Usted tiene debilidad en un lado del cuerpo.     Usted tiene dolor en el pecho, sudoración o falta de aire.    Pregúntele a bartholomew médico qué vitaminas y minerales son adecuados para usted.   Usted tiene síntomas de enfermedad de la vesícula biliar o el hígado, darío dolor en la parte superior del abdomen.    Usted tiene dolor e incomodidad de rodillas o caderas al caminar.     Usted presenta síntomas de diabetes, darío exceso de apetito y sed y micción frecuente.     Usted presenta síntomas de apnea de sueño, darío roncar o tener sueño keena el día.     Usted tiene preguntas o inquietudes acerca de bartholomew condición o cuidado.    El tratamiento para la obesidad  se enfoca en ayudarle a bajar de peso para mejorar bartholomew guanakito. Incluso rocky reducción mínima del índice de masa corporal puede reducir el riesgo de muchos problemas de guanakito. Bartholomew médico lo ayudará a fijarse rocky meta para bajar de peso.  Cambios en el estilo de darrin  son los primeros pasos para tratar la obesidad. Estos cambios incluyen zahida decisiones para consumir alimentos  saludables y realizar rocky actividad física con regularidad. El médico puede recomendarle un programa para bajar de peso que consta de capacitación, educación y terapia.     Medicamento  le pueden ayudar a bajar de peso cuando los utiliza en conjunto con rocky dieta y actividad física.     Cirugía  le puede ayudar a bajar de peso si usted es muy philipp y presenta otros problemas de guanakito. Existen varios tipos de cirugía para adelgazar. [place] a bartholomew médico más información.    Cómo perder peso de forma exitosa:   Propóngase metas accesibles y realistas.  Un ejemplo de rocky meta accesible es caminar 20 minutos los 5 días de la semana. Otro objetivo puede ser perder 5% de bartholomew peso corporal.    Coméntele a debi amigos, familiares y compañeros de trabajo sobre debi metas  y solicite que lo apoyen. Convide a un amigo para hacer ejercicio juntos o acuda a un stanislav de motivación para bajar de peso.    Identifique los alimentos o situaciones que le pueden provocar que coma en exceso y busque formas para evitarlos. Deshágase de alimentos altos en calorías en bartholomew hogar o en el trabajo. En la cocina tenga rocky canasta con frutas frescas. Si el estrés es la causa para que usted coma más encuentre formas para sobrellevar el estrés.     Lleve un diario en el que registre lo que usted come y meghna.  También escriba la cantidad de tiempo que pasa realizando rocky actividad física keena el día. Pésese rocky vez a la semana y anótelo en bartholomew diario.    Cambios en la alimentación:  Usted necesitará consumir menos de 500 a 1.000 calorías al día de las que usted actualmente consume para perder entre 1 a 2 libras a la semana. Los siguientes cambios le ayudarán a disminuir la cantidad de las calorías que normalmente consume:  Reduzca el tamaño de las porciones.  Utilice platos pequeños que no midan más de 9 pulgadas de diámetro. Llene la mitad del plato con frutas y verduras. Utilice las tazas medidoras para racionar los alimentos hasta que usted sepa darío  se ve el tamaño de rocky porción.     Consuma 3 comidas y 1 o 2 meriendas al día.  Planee debi comidas con anterioridad. Cocine y coma en la casa todo el tiempo que le sea posible. Coma lentamente.     Consuma frutas y verduras con todas las comidas.  Las frutas y verduras son bajas en calorías y altas en fibra lo cual lo llena. No adicione mantequilla, ni margarina, ni salsas a base de crema a las verduras. Utilice las hierbas para sazonar las verduras al vapor.     Consuma menos grasas y alimentos fritos.  Consuma salazar o pescado al horno o la paul. Estas proteínas son más bajas en calorías y grasas que la carne magra. Limite las comidas rápidas. Es mejor que utilice aderezos para la ensalada a base de aceite de mackay y vinagre en vez de aderezos en botella.     Limite la cantidad de azúcar que consume.  No consuma bebidas azucaradas. Limite el consumo de alcohol.    Cambios de actividad:  La actividad física es buena para bartholomew cuerpo por muchas razones. Le ayuda a quemar calorías y fortalecer debi músculos. Disminuye el estrés y la depresión y mejora bartholomew estado de ánimo. Además puede ayudarle a dormir mejor. Consulte con bartholomew médico antes de empezar un régimen de ejercicios.  Realice rocky actividad física por lo menos por 30 minutos 5 días a la semana.  Empiece despacio. Aparte tiempo cada día para rocky actividad física que usted disfrute y que le sea conveniente. Es mejor realizar tanto un programa de pesas darío rocky actividad para aumentar bartholomew ritmo cardíaco, darío caminar, montar en bicicleta o nadar.     Busque formas para ser más activo.  Realice rocky actividad de jardinería y limpiar la casa. Suba las escaleras en vez de utilizar el elevador. En bartholomew tiempo nimesh concurra a eventos que le exijan caminar, darío festivales y ferias al aire nimesh. Adicionar esta actividad física puede ayudarle a bajar y mantener el peso.    Acuda a debi consultas de control con bartholomew médico según le indicaron.  Puede que necesite consultar con  un dietista. Anote debi preguntas para que se acuerde de hacerlas keena debi visitas.

## 2024-11-07 NOTE — PROGRESS NOTES
ANNUAL GYNECOLOGICAL EXAMINATION    Adriana Ashton is a 29 y.o. female who presents today for annual GYN exam.  Her last pap smear was performed 2023 and result was NILM.  She reports no history of abnormal pap smears in her past.  She had HIV screening performed 2023 and it was negative.  She reports menses as absent due to Depoprovera therapy.  Her general medical history has been reviewed and she reports it as follows:    Past Medical History:   Diagnosis Date    Anxiety     Fibroid     Pancreatitis      Past Surgical History:   Procedure Laterality Date    NO PAST SURGERIES       OB History          2    Para   1    Term   1       0    AB   1    Living   1         SAB   1    IAB   0    Ectopic   0    Multiple   0    Live Births   1               Social History     Tobacco Use    Smoking status: Never    Smokeless tobacco: Never   Vaping Use    Vaping status: Never Used   Substance Use Topics    Alcohol use: Never    Drug use: Never     Social History     Substance and Sexual Activity   Sexual Activity Yes    Partners: Male    Birth control/protection: Injection     Cancer-related family history is negative for Cancer, Ovarian cancer, Colon cancer, and Breast cancer.    Current Outpatient Medications   Medication Instructions    medroxyPROGESTERone (DEPO-PROVERA) 150 mg, Intramuscular, Every 3 months       Review of Systems:  Review of Systems   Constitutional: Negative.    Gastrointestinal: Negative.    Genitourinary:  Positive for menstrual problem (amenorrhea due to Depoprovera). Negative for difficulty urinating, pelvic pain and vaginal discharge.   Skin: Negative.        Physical Exam:  /70 (BP Location: Left arm, Patient Position: Sitting, Cuff Size: Large)   Pulse 87   Wt 98 kg (216 lb)   LMP  (LMP Unknown)   Breastfeeding No   BMI 32.84 kg/m²   Physical Exam  Constitutional:       General: She is not in acute distress.     Appearance: She is  well-developed.   Genitourinary:      Vulva normal.      No lesions in the vagina.        Right Adnexa: not tender and no mass present.     Left Adnexa: not tender and no mass present.     No cervical motion tenderness or lesion.      Uterus is not tender.   Breasts:     Right: No mass, nipple discharge, skin change or tenderness.      Left: No mass, nipple discharge, skin change or tenderness.   Neck:      Thyroid: No thyromegaly.   Cardiovascular:      Rate and Rhythm: Normal rate and regular rhythm.   Pulmonary:      Effort: Pulmonary effort is normal.   Abdominal:      Palpations: Abdomen is soft.      Tenderness: There is no abdominal tenderness.   Musculoskeletal:      Cervical back: Neck supple.   Neurological:      Mental Status: She is alert and oriented to person, place, and time.   Skin:     General: Skin is warm and dry.   Vitals reviewed.       Assessment/Plan:   1. Normal well-woman GYN exam.  2. Cervical cancer screening:  Normal cervical exam.  Pap smear not indicated at this time.  Has received HPV vaccine in the past, but she desires to initiate vaccine series now.  Given HPV vaccine today and she will return in 2 and 6 months for subsequent vaccinations.     3. STD screening:  Patient declines.   4. Breast cancer screening:  Normal breast exam.  Reviewed breast self-awareness.   5. Depression Screening: Patient's depression screening was assessed with a PHQ-2 score of 0. Clinically patient does not have depression. No treatment is required.   6. BMI Counseling: Body mass index is 32.84 kg/m². Discussed the patient's BMI with her. The BMI is above normal. Nutrition recommendations include reducing portion sizes and decreasing overall calorie intake.   7. Contraception:  Depoprovera every 3 months.  Given Rx refills.   8. Return to office in 1 year for annual GYN exam.

## 2024-11-18 ENCOUNTER — HOSPITAL ENCOUNTER (EMERGENCY)
Facility: HOSPITAL | Age: 29
Discharge: HOME | End: 2024-11-18
Attending: EMERGENCY MEDICINE
Payer: COMMERCIAL

## 2024-11-18 ENCOUNTER — APPOINTMENT (EMERGENCY)
Dept: RADIOLOGY | Facility: HOSPITAL | Age: 29
End: 2024-11-18
Payer: COMMERCIAL

## 2024-11-18 VITALS
TEMPERATURE: 98.4 F | BODY MASS INDEX: 32.69 KG/M2 | DIASTOLIC BLOOD PRESSURE: 92 MMHG | OXYGEN SATURATION: 100 % | RESPIRATION RATE: 18 BRPM | HEART RATE: 93 BPM | SYSTOLIC BLOOD PRESSURE: 125 MMHG | WEIGHT: 215 LBS

## 2024-11-18 DIAGNOSIS — J06.9 VIRAL URI WITH COUGH: Primary | ICD-10-CM

## 2024-11-18 LAB
FLUAV AG UPPER RESP QL IA.RAPID: NEGATIVE
FLUBV AG UPPER RESP QL IA.RAPID: NEGATIVE
SARS-COV+SARS-COV-2 AG RESP QL IA.RAPID: NEGATIVE

## 2024-11-18 PROCEDURE — 87811 SARS-COV-2 COVID19 W/OPTIC: CPT | Performed by: EMERGENCY MEDICINE

## 2024-11-18 PROCEDURE — 99284 EMERGENCY DEPT VISIT MOD MDM: CPT

## 2024-11-18 PROCEDURE — 87804 INFLUENZA ASSAY W/OPTIC: CPT | Performed by: EMERGENCY MEDICINE

## 2024-11-18 PROCEDURE — 71045 X-RAY EXAM CHEST 1 VIEW: CPT

## 2024-11-18 PROCEDURE — 99284 EMERGENCY DEPT VISIT MOD MDM: CPT | Performed by: PHYSICIAN ASSISTANT

## 2024-11-18 RX ORDER — BENZONATATE 100 MG/1
100 CAPSULE ORAL ONCE
Status: COMPLETED | OUTPATIENT
Start: 2024-11-18 | End: 2024-11-18

## 2024-11-18 RX ORDER — GUAIFENESIN 200 MG/10ML
100-200 LIQUID ORAL EVERY 4 HOURS PRN
Qty: 60 ML | Refills: 0 | Status: SHIPPED | OUTPATIENT
Start: 2024-11-18

## 2024-11-18 RX ORDER — LIDOCAINE HYDROCHLORIDE 20 MG/ML
15 SOLUTION OROPHARYNGEAL ONCE
Status: COMPLETED | OUTPATIENT
Start: 2024-11-18 | End: 2024-11-18

## 2024-11-18 RX ORDER — LIDOCAINE HYDROCHLORIDE 20 MG/ML
15 SOLUTION OROPHARYNGEAL 4 TIMES DAILY PRN
Qty: 100 ML | Refills: 0 | Status: SHIPPED | OUTPATIENT
Start: 2024-11-18

## 2024-11-18 RX ORDER — BENZONATATE 100 MG/1
100 CAPSULE ORAL 3 TIMES DAILY PRN
Qty: 20 CAPSULE | Refills: 0 | Status: SHIPPED | OUTPATIENT
Start: 2024-11-18

## 2024-11-18 RX ADMIN — LIDOCAINE HYDROCHLORIDE 15 ML: 20 SOLUTION ORAL at 01:11

## 2024-11-18 RX ADMIN — BENZONATATE 100 MG: 100 CAPSULE ORAL at 01:11

## 2024-11-18 NOTE — ED PROVIDER NOTES
Time reflects when diagnosis was documented in both MDM as applicable and the Disposition within this note       Time User Action Codes Description Comment    11/18/2024  1:43 AM Genevieve Mclaughlin Add [J06.9] Viral URI with cough           ED Disposition       ED Disposition   Discharge    Condition   Stable    Date/Time   Mon Nov 18, 2024  1:43 AM    Comment   Adriana Naomi ZuñigaorrealTorbelinda discharge to home/self care.                   Assessment & Plan       Medical Decision Making  Pt had hx and physical exam consistent with acute viral infection.  COVID and flu testing negative no focal signs of infection on exam warranting antibiotics.  Patient denies chest pain or shortness of breath.  Appears well on exam.  Hemodynamically stable.  Educated extensively on supportive care and return precautions and demonstrates understanding.  Stable for discharge home.     Amount and/or Complexity of Data Reviewed  Labs: ordered.  Radiology: ordered and independent interpretation performed.    Risk  Prescription drug management.             Medications   Lidocaine Viscous HCl (XYLOCAINE) 2 % mucosal solution 15 mL (15 mL Swish & Spit Given 11/18/24 0111)   benzonatate (TESSALON PERLES) capsule 100 mg (100 mg Oral Given 11/18/24 0111)       ED Risk Strat Scores                           SBIRT 22yo+      Flowsheet Row Most Recent Value   Initial Alcohol Screen: US AUDIT-C     1. How often do you have a drink containing alcohol? 0 Filed at: 11/18/2024 0054   2. How many drinks containing alcohol do you have on a typical day you are drinking?  0 Filed at: 11/18/2024 0054   3b. FEMALE Any Age, or MALE 65+: How often do you have 4 or more drinks on one occassion? 0 Filed at: 11/18/2024 0054   Audit-C Score 0 Filed at: 11/18/2024 0054   JON: How many times in the past year have you...    Used an illegal drug or used a prescription medication for non-medical reasons? Never Filed at: 11/18/2024 0054                             History of Present Illness       Chief Complaint   Patient presents with    Cold Like Symptoms     Pt reports nasal congestion, productive cough and sore throat that has been going on for about two weeks. States it hurts when she tries to talk and swallow. Arlin any fevers at home. Took Mucinex around 5pm last night.        Past Medical History:   Diagnosis Date    Anxiety     Fibroid     Pancreatitis 2010      Past Surgical History:   Procedure Laterality Date    NO PAST SURGERIES        Family History   Problem Relation Age of Onset    Hypertension Mother     Diabetes Father     Cancer Neg Hx     Ovarian cancer Neg Hx     Colon cancer Neg Hx     Breast cancer Neg Hx       Social History     Tobacco Use    Smoking status: Never    Smokeless tobacco: Never   Vaping Use    Vaping status: Never Used   Substance Use Topics    Alcohol use: Never    Drug use: Never      E-Cigarette/Vaping    E-Cigarette Use Never User       E-Cigarette/Vaping Substances    Nicotine No     THC No     CBD No     Flavoring No     Other No       I have reviewed and agree with the history as documented.     29-year-old female without significant past medical history presents complaining of 2 weeks of dry cough sore throat and loss of voice.  Patient states multiple family members at home are sick with similar and that her daughter was diagnosed with pneumonia and she just wants to make sure that this not what she has.  Denies fevers.  Denies shortness of breath or painful breathing but states that her throat hurts when coughing.  Tried over-the-counter cough syrups without relief. Denies any other complaints       History provided by:  Patient   used: No        Review of Systems   Constitutional: Negative.  Negative for chills and fatigue.   HENT:  Negative for ear pain and sore throat.    Eyes:  Negative for photophobia and redness.   Respiratory:  Positive for cough. Negative for apnea and shortness of breath.     Cardiovascular:  Negative for chest pain.   Gastrointestinal:  Negative for abdominal pain, nausea and vomiting.   Genitourinary:  Negative for dysuria.   Musculoskeletal:  Negative for arthralgias, neck pain and neck stiffness.   Skin:  Negative for rash.   Neurological:  Negative for dizziness, tremors, syncope and weakness.   Psychiatric/Behavioral:  Negative for suicidal ideas.            Objective       ED Triage Vitals [11/18/24 0053]   Temperature Pulse Blood Pressure Respirations SpO2 Patient Position - Orthostatic VS   98.4 °F (36.9 °C) 93 125/92 18 100 % Sitting      Temp Source Heart Rate Source BP Location FiO2 (%) Pain Score    Oral Monitor Left arm -- --      Vitals      Date and Time Temp Pulse SpO2 Resp BP Pain Score FACES Pain Rating User   11/18/24 0053 98.4 °F (36.9 °C) 93 100 % 18 125/92 -- -- KA            Physical Exam  Constitutional:       General: She is not in acute distress.     Appearance: She is well-developed. She is not diaphoretic.   Eyes:      Pupils: Pupils are equal, round, and reactive to light.   Cardiovascular:      Rate and Rhythm: Normal rate and regular rhythm.   Pulmonary:      Effort: Pulmonary effort is normal. No respiratory distress.      Breath sounds: Normal breath sounds. No wheezing or rales.   Abdominal:      General: Bowel sounds are normal. There is no distension.      Palpations: Abdomen is soft.   Musculoskeletal:         General: Normal range of motion.      Cervical back: Normal range of motion and neck supple.   Skin:     General: Skin is warm and dry.   Neurological:      Mental Status: She is alert and oriented to person, place, and time.         Results Reviewed       Procedure Component Value Units Date/Time    FLU/COVID Rapid Antigen (30 min. TAT) - Preferred screening test in ED [[id number]]  (Normal) Collected: 11/18/24 0057    Lab Status: Final result Specimen: Nares from Nose Updated: 11/18/24 0120     SARS COV Rapid Antigen Negative     Influenza A  Rapid Antigen Negative     Influenza B Rapid Antigen Negative    Narrative:      This test has been performed using the Quidel Ivy 2 FLU+SARS Antigen test under the Emergency Use Authorization (EUA). This test has been validated by the  and verified by the performing laboratory. The Ivy uses lateral flow immunofluorescent sandwich assay to detect SARS-COV, Influenza A and Influenza B Antigen.     The Quidel Ivy 2 SARS Antigen test does not differentiate between SARS-CoV and SARS-CoV-2.     Negative results are presumptive and may be confirmed with a molecular assay, if necessary, for patient management. Negative results do not rule out SARS-CoV-2 or influenza infection and should not be used as the sole basis for treatment or patient management decisions. A negative test result may occur if the level of antigen in a sample is below the limit of detection of this test.     Positive results are indicative of the presence of viral antigens, but do not rule out bacterial infection or co-infection with other viruses.     All test results should be used as an adjunct to clinical observations and other information available to the provider.    FOR PEDIATRIC PATIENTS - copy/paste COVID Guidelines URL to browser: [email or web address]            XR chest 1 view portable   ED Interpretation by Genevieve Mclaughlin PA-C (11/18 0133)   No obvious focal consolidation          Procedures    ED Medication and Procedure Management   Prior to Admission Medications   Prescriptions Last Dose Informant Patient Reported? Taking?   medroxyPROGESTERone (DEPO-PROVERA) 150 mg/mL injection   No No   Sig: Inject 1 mL (150 mg total) into a muscle every 3 (three) months      Facility-Administered Medications: None     Patient's Medications   Discharge Prescriptions    BENZONATATE (TESSALON PERLES) 100 MG CAPSULE    Take 1 capsule (100 mg total) by mouth 3 (three) times a day  as needed for cough       Start Date: 11/18/2024End Date: --       Order Dose: 100 mg       Quantity: 20 capsule    Refills: 0    GUAIFENESIN (ROBITUSSIN) 100 MG/5ML ORAL LIQUID    Take 5-10 mL (100-200 mg total) by mouth every 4 (four) hours as needed for cough       Start Date: 11/18/2024End Date: --       Order Dose: 100-200 mg       Quantity: 60 mL    Refills: 0    LIDOCAINE VISCOUS HCL (XYLOCAINE) 2 % MUCOSAL SOLUTION    Swish and spit 15 mL 4 (four) times a day as needed for mouth pain or discomfort       Start Date: 11/18/2024End Date: --       Order Dose: 15 mL       Quantity: 100 mL    Refills: 0     No discharge procedures on file.  ED SEPSIS DOCUMENTATION   Time reflects when diagnosis was documented in both MDM as applicable and the Disposition within this note       Time User Action Codes Description Comment    11/18/2024  1:43 AM Genevieve Mclaughlin Add [J06.9] Viral URI with cough                  Genevieve Mclaughlin PA-C  11/18/24 0145

## 2024-11-27 ENCOUNTER — APPOINTMENT (OUTPATIENT)
Dept: LAB | Facility: CLINIC | Age: 29
End: 2024-11-27
Payer: COMMERCIAL

## 2024-11-27 ENCOUNTER — OFFICE VISIT (OUTPATIENT)
Dept: FAMILY MEDICINE CLINIC | Facility: CLINIC | Age: 29
End: 2024-11-27

## 2024-11-27 VITALS
BODY MASS INDEX: 33.05 KG/M2 | OXYGEN SATURATION: 99 % | HEART RATE: 80 BPM | RESPIRATION RATE: 16 BRPM | TEMPERATURE: 98.4 F | WEIGHT: 218.1 LBS | SYSTOLIC BLOOD PRESSURE: 116 MMHG | DIASTOLIC BLOOD PRESSURE: 64 MMHG | HEIGHT: 68 IN

## 2024-11-27 DIAGNOSIS — E66.9 OBESITY (BMI 30-39.9): ICD-10-CM

## 2024-11-27 DIAGNOSIS — Z00.00 ANNUAL PHYSICAL EXAM: Primary | ICD-10-CM

## 2024-11-27 DIAGNOSIS — R22.1 NECK MASS: ICD-10-CM

## 2024-11-27 DIAGNOSIS — Z23 ENCOUNTER FOR IMMUNIZATION: ICD-10-CM

## 2024-11-27 LAB
ANION GAP SERPL CALCULATED.3IONS-SCNC: 6 MMOL/L (ref 4–13)
BUN SERPL-MCNC: 13 MG/DL (ref 5–25)
CALCIUM SERPL-MCNC: 9 MG/DL (ref 8.4–10.2)
CHLORIDE SERPL-SCNC: 106 MMOL/L (ref 96–108)
CHOLEST SERPL-MCNC: 132 MG/DL (ref ?–200)
CO2 SERPL-SCNC: 28 MMOL/L (ref 21–32)
CREAT SERPL-MCNC: 0.77 MG/DL (ref 0.6–1.3)
EST. AVERAGE GLUCOSE BLD GHB EST-MCNC: 94 MG/DL
GFR SERPL CREATININE-BSD FRML MDRD: 106 ML/MIN/1.73SQ M
GLUCOSE P FAST SERPL-MCNC: 83 MG/DL (ref 65–99)
HBA1C MFR BLD: 4.9 %
HDLC SERPL-MCNC: 37 MG/DL
LDLC SERPL CALC-MCNC: 81 MG/DL (ref 0–100)
POTASSIUM SERPL-SCNC: 3.6 MMOL/L (ref 3.5–5.3)
SODIUM SERPL-SCNC: 140 MMOL/L (ref 135–147)
TRIGL SERPL-MCNC: 71 MG/DL (ref ?–150)

## 2024-11-27 PROCEDURE — 90471 IMMUNIZATION ADMIN: CPT

## 2024-11-27 PROCEDURE — 80061 LIPID PANEL: CPT

## 2024-11-27 PROCEDURE — 36415 COLL VENOUS BLD VENIPUNCTURE: CPT

## 2024-11-27 PROCEDURE — 80048 BASIC METABOLIC PNL TOTAL CA: CPT

## 2024-11-27 PROCEDURE — 90656 IIV3 VACC NO PRSV 0.5 ML IM: CPT

## 2024-11-27 PROCEDURE — 99213 OFFICE O/P EST LOW 20 MIN: CPT

## 2024-11-27 PROCEDURE — 99395 PREV VISIT EST AGE 18-39: CPT

## 2024-11-27 PROCEDURE — 83036 HEMOGLOBIN GLYCOSYLATED A1C: CPT

## 2024-11-27 NOTE — PATIENT INSTRUCTIONS
"[phone]: sonogram   Weight management: [phone]  Routine physical for adults   The Basics   Written by the doctors and editors at [facility]   What is a physical? -- A physical is a routine visit, or \"check-up,\" with your doctor. You might also hear it called a \"wellness visit\" or \"preventive visit.\"  During each visit, the doctor will:   Ask about your physical and mental health   Ask about your habits, behaviors, and lifestyle   Do an exam   Give you vaccines if needed   Talk to you about any medicines you take   Give advice about your health   Answer your questions  Getting regular check-ups is an important part of taking care of your health. It can help your doctor find and treat any problems you have. But it's also important for preventing health problems.  A routine physical is different from a \"sick visit.\" A sick visit is when you see a doctor because of a health concern or problem. Since physicals are scheduled ahead of time, you can think about what you want to ask the doctor.  How often should I get a physical? -- It depends on your age and health. In general, for people age 21 years and older:   If you are younger than 50 years, you might be able to get a physical every 3 years.   If you are 50 years or older, your doctor might recommend a physical every year.  If you have an ongoing health condition, like diabetes or high blood pressure, your doctor will probably want to see you more often.  What happens during a physical? -- In general, each visit will include:   Physical exam - The doctor or nurse will check your height, weight, heart rate, and blood pressure. They will also look at your eyes and ears. They will ask about how you are feeling and whether you have any symptoms that bother you.   Medicines - It's a good idea to bring a list of all the medicines you take to each doctor visit. Your doctor will talk to you about your medicines and answer any questions. Tell them if you are having any " "side effects that bother you. You should also tell them if you are having trouble paying for any of your medicines.   Habits and behaviors - This includes:   Your diet   Your exercise habits   Whether you smoke, drink alcohol, or use drugs   Whether you are sexually active   Whether you feel safe at home  Your doctor will talk to you about things you can do to improve your health and lower your risk of health problems. They will also offer help and support. For example, if you want to quit smoking, they can give you advice and might prescribe medicines. If you want to improve your diet or get more physical activity, they can help you with this, too.   Lab tests, if needed - The tests you get will depend on your age and situation. For example, your doctor might want to check your:   Cholesterol   Blood sugar   Iron level   Vaccines - The recommended vaccines will depend on your age, health, and what vaccines you already had. Vaccines are very important because they can prevent certain serious or deadly infections.   Discussion of screening - \"Screening\" means checking for diseases or other health problems before they cause symptoms. Your doctor can recommend screening based on your age, risk, and preferences. This might include tests to check for:   Cancer, such as breast, prostate, cervical, ovarian, colorectal, prostate, lung, or skin cancer   Sexually transmitted infections, such as chlamydia and gonorrhea   Mental health conditions like depression and anxiety  Your doctor will talk to you about the different types of screening tests. They can help you decide which screenings to have. They can also explain what the results might mean.   Answering questions - The physical is a good time to ask the doctor or nurse questions about your health. If needed, they can refer you to other doctors or specialists, too.  Adults older than 65 years often need other care, too. As you get older, your doctor will talk to you " about:   How to prevent falling at home   Hearing or vision tests   Memory testing   How to take your medicines safely   Making sure that you have the help and support you need at home  All topics are updated as new evidence becomes available and our peer review process is complete.  This topic retrieved from [facility] on: May 02, 2024.  Topic 634229 Version 1.0  Release: 32.4.3 - C32.122  © 2024 UpToDate, Inc. and/or its affiliates. All rights reserved.  Consumer Information Use and Disclaimer   Disclaimer: This generalized information is a limited summary of diagnosis, treatment, and/or medication information. It is not meant to be comprehensive and should be used as a tool to help the user understand and/or assess potential diagnostic and treatment options. It does NOT include all information about conditions, treatments, medications, side effects, or risks that may apply to a specific patient. It is not intended to be medical advice or a substitute for the medical advice, diagnosis, or treatment of a health care provider based on the health care provider's examination and assessment of a patient's specific and unique circumstances. Patients must speak with a health care provider for complete information about their health, medical questions, and treatment options, including any risks or benefits regarding use of medications. This information does not endorse any treatments or medications as safe, effective, or approved for treating a specific patient. UpToDate, Inc. and its affiliates disclaim any warranty or liability relating to this information or the use thereof.The use of this information is governed by the Terms of Use, available at https://www.wolterskluwer.com/en/know/clinical-effectiveness-terms. 2024© UpToDate, Inc. and its affiliates and/or licensors. All rights reserved.  Copyright   © 2024 UpToDate, Inc. and/or its affiliates. All rights reserved.

## 2024-11-27 NOTE — PROGRESS NOTES
Adult Annual Physical  Name: Adriana Meza      : 1995      MRN: [id number]  Encounter Provider: DANIELLE Vanegas  Encounter Date: 2024   Encounter department: [facility]    Assessment & Plan  Annual physical exam         Encounter for immunization    Orders:    influenza vaccine preservative-free 0.5 mL IM (Fluzone, Afluria, Fluarix, Flulaval)    Obesity (BMI 30-39.9)      Orders:    Basic metabolic panel; Future    Hemoglobin A1C; Future    Lipid Panel with Direct LDL reflex; Future    Ambulatory Referral to Weight Management; Future    Neck mass    Orders:    US head neck soft tissue; Future    Immunizations and preventive care screenings were discussed with patient today. Appropriate education was printed on patient's after visit summary.    Counseling:  Dental Health: discussed importance of regular tooth brushing, flossing, and dental visits.  Exercise: the importance of regular exercise/physical activity was discussed. Recommend exercise 3-5 times per week for at least 30 minutes.          History of Present Illness     Adult Annual Physical:  Patient presents for annual physical. She reports that she feels as though she has had a lump growing on the back of her neck. It was first noted about 15 days ago. It does not cause discomfort.     Diet and Physical Activity:  - Diet/Nutrition: poor diet.  - Exercise: no formal exercise.    General Health:  - Sleep: 4-6 hours of sleep on average and sleeps poorly.  - Hearing: normal hearing bilateral ears.  - Vision: vision problems, goes for regular eye exams and wears glasses.  - Dental: no dental visits for > 1 year and brushes teeth twice daily. flosses    /GYN Health:  - Follows with GYN: yes.   - Menopause: premenopausal.   - History of STDs: no    Review of Systems  As per HPI        Objective   /64 (BP Location: Left arm, Patient Position: Sitting, Cuff Size: Standard)    "Pulse 80   Temp 98.4 °F (36.9 °C) (Temporal)   Resp 16   Ht 5' 8\" (1.727 m)   Wt 98.9 kg (218 lb 1.6 oz)   LMP  (LMP Unknown)   SpO2 99%   BMI 33.16 kg/m²     Physical Exam  Vitals and nursing note reviewed.   Constitutional:       General: She is not in acute distress.     Appearance: Normal appearance. She is well-developed. She is obese.   HENT:      Head: Normocephalic and atraumatic.      Right Ear: External ear normal.      Left Ear: External ear normal.      Nose: Nose normal.   Eyes:      Conjunctiva/sclera: Conjunctivae normal.   Neck:      Comments: Soft, palpable mass noted on cervical area.   Cardiovascular:      Rate and Rhythm: Normal rate and regular rhythm.      Pulses: Normal pulses.      Heart sounds: Normal heart sounds. No murmur heard.  Pulmonary:      Effort: Pulmonary effort is normal. No respiratory distress.      Breath sounds: Normal breath sounds.   Abdominal:      Palpations: Abdomen is soft.      Tenderness: There is no abdominal tenderness.   Musculoskeletal:         General: No swelling. Normal range of motion.      Cervical back: Full passive range of motion without pain, normal range of motion and neck supple.   Skin:     General: Skin is warm and dry.      Capillary Refill: Capillary refill takes less than 2 seconds.   Neurological:      General: No focal deficit present.      Mental Status: She is alert and oriented to person, place, and time. Mental status is at baseline.   Psychiatric:         Mood and Affect: Mood normal.         Behavior: Behavior normal.         Thought Content: Thought content normal.         Judgment: Judgment normal.         "

## 2024-11-29 ENCOUNTER — RESULTS FOLLOW-UP (OUTPATIENT)
Dept: FAMILY MEDICINE CLINIC | Facility: CLINIC | Age: 29
End: 2024-11-29

## 2024-12-04 ENCOUNTER — PATIENT OUTREACH (OUTPATIENT)
Dept: OBGYN CLINIC | Facility: CLINIC | Age: 29
End: 2024-12-04

## 2024-12-06 NOTE — PROGRESS NOTES
Pt attempted to call UYEN BOYLE this week, UYEN BOYLE called pt back on Friday 12/06, pt did answer, pt reports that she needs to renew her medicaid and needs assistance, UYEN BOYLE enc her to stop by the [facility] office M-F for assistance. Pt also reports a change in her income and asked if she needs to go to the [facility] to fill out a change of income form, UYEN BOYLE verified she should do this. No other questions.

## 2024-12-18 ENCOUNTER — HOSPITAL ENCOUNTER (OUTPATIENT)
Dept: ULTRASOUND IMAGING | Facility: HOSPITAL | Age: 29
Discharge: HOME | End: 2024-12-18
Payer: COMMERCIAL

## 2024-12-18 DIAGNOSIS — R22.1 NECK MASS: ICD-10-CM

## 2024-12-18 PROCEDURE — 76536 US EXAM OF HEAD AND NECK: CPT

## 2025-01-08 ENCOUNTER — CLINICAL SUPPORT (OUTPATIENT)
Dept: OBGYN CLINIC | Facility: CLINIC | Age: 30
End: 2025-01-08

## 2025-01-08 VITALS
DIASTOLIC BLOOD PRESSURE: 72 MMHG | BODY MASS INDEX: 33.4 KG/M2 | HEART RATE: 73 BPM | SYSTOLIC BLOOD PRESSURE: 107 MMHG | WEIGHT: 220.4 LBS | HEIGHT: 68 IN

## 2025-01-08 DIAGNOSIS — Z30.09 UNWANTED FERTILITY: ICD-10-CM

## 2025-01-08 DIAGNOSIS — Z59.41 FOOD INSECURITY: Primary | ICD-10-CM

## 2025-01-08 DIAGNOSIS — Z30.42 ENCOUNTER FOR SURVEILLANCE OF INJECTABLE CONTRACEPTIVE: ICD-10-CM

## 2025-01-08 LAB — SL AMB POCT URINE HCG: NEGATIVE

## 2025-01-08 PROCEDURE — 96372 THER/PROPH/DIAG INJ SC/IM: CPT

## 2025-01-08 PROCEDURE — 81025 URINE PREGNANCY TEST: CPT

## 2025-01-08 RX ORDER — MEDROXYPROGESTERONE ACETATE 150 MG/ML
150 INJECTION, SUSPENSION INTRAMUSCULAR ONCE
Status: COMPLETED | OUTPATIENT
Start: 2025-01-08 | End: 2025-01-08

## 2025-01-08 RX ADMIN — MEDROXYPROGESTERONE ACETATE 150 MG: 150 INJECTION, SUSPENSION INTRAMUSCULAR at 16:38

## 2025-01-08 SDOH — ECONOMIC STABILITY - FOOD INSECURITY: FOOD INSECURITY: Z59.41

## 2025-01-08 NOTE — PROGRESS NOTES
Depo given to patient in left arm on 1/8/2025. Pregnancy test is negative.    NDC: 45220-752-55  LOT: [id number]  EXP: 05/31/2029

## 2025-01-09 ENCOUNTER — PATIENT OUTREACH (OUTPATIENT)
Dept: OBGYN CLINIC | Facility: CLINIC | Age: 30
End: 2025-01-09

## 2025-01-09 NOTE — PROGRESS NOTES
UYEN BOYLE spoke with 30 y/o-S- P1-  Maori speaking woman to address her needs. UYEN BOYLE explained her role and discussed the reason for the call. Pt resides with her 11 months old daughter. Pt is employed but she is only working 30 hrs. Pt reported her finance is tight. Pt has MA and only $23 in SNAP. UYEN BOYLE discussed food schaffer and sent information of the closest one via find help.    Pt denies any usage of drug, alcohol or smoking. Pt denies transportation issues. Pt claimed her mom is very supportive. Pt denies other needs at this time. UYEN BOYLE explained her role and provided contact information. Pt was encouraged to call as needed.

## 2025-01-13 ENCOUNTER — TELEPHONE (OUTPATIENT)
Dept: OBGYN CLINIC | Facility: CLINIC | Age: 30
End: 2025-01-13

## 2025-01-13 DIAGNOSIS — Z30.09 UNWANTED FERTILITY: ICD-10-CM

## 2025-01-13 RX ORDER — MEDROXYPROGESTERONE ACETATE 150 MG/ML
150 INJECTION, SUSPENSION INTRAMUSCULAR
Qty: 1 ML | Refills: 3 | Status: SHIPPED | OUTPATIENT
Start: 2025-01-13

## 2025-01-15 ENCOUNTER — CLINICAL SUPPORT (OUTPATIENT)
Dept: OBGYN CLINIC | Facility: CLINIC | Age: 30
End: 2025-01-15

## 2025-01-15 VITALS
DIASTOLIC BLOOD PRESSURE: 76 MMHG | BODY MASS INDEX: 33.43 KG/M2 | HEART RATE: 76 BPM | HEIGHT: 68 IN | WEIGHT: 220.6 LBS | SYSTOLIC BLOOD PRESSURE: 118 MMHG

## 2025-01-15 DIAGNOSIS — Z23 NEED FOR HPV VACCINATION: Primary | ICD-10-CM

## 2025-01-15 PROCEDURE — 90471 IMMUNIZATION ADMIN: CPT

## 2025-01-15 PROCEDURE — 90651 9VHPV VACCINE 2/3 DOSE IM: CPT

## 2025-01-15 NOTE — PROGRESS NOTES
HPV given to patient in left arm on 1/15/2025.    NDC: 9657-2716-71  LOT: [id number]  EXP: 10/26/2026

## 2025-01-16 ENCOUNTER — HOSPITAL ENCOUNTER (EMERGENCY)
Facility: HOSPITAL | Age: 30
Discharge: HOME | End: 2025-01-16
Attending: EMERGENCY MEDICINE
Payer: COMMERCIAL

## 2025-01-16 ENCOUNTER — APPOINTMENT (EMERGENCY)
Dept: CT IMAGING | Facility: HOSPITAL | Age: 30
End: 2025-01-16
Payer: COMMERCIAL

## 2025-01-16 VITALS
WEIGHT: 223.33 LBS | TEMPERATURE: 98.4 F | RESPIRATION RATE: 18 BRPM | BODY MASS INDEX: 33.96 KG/M2 | OXYGEN SATURATION: 98 % | HEART RATE: 72 BPM | SYSTOLIC BLOOD PRESSURE: 143 MMHG | DIASTOLIC BLOOD PRESSURE: 82 MMHG

## 2025-01-16 DIAGNOSIS — J02.9 SORE THROAT: Primary | ICD-10-CM

## 2025-01-16 LAB
EXT PREGNANCY TEST URINE: NEGATIVE
EXT. CONTROL: NORMAL

## 2025-01-16 PROCEDURE — 81025 URINE PREGNANCY TEST: CPT | Performed by: EMERGENCY MEDICINE

## 2025-01-16 PROCEDURE — 70490 CT SOFT TISSUE NECK W/O DYE: CPT

## 2025-01-16 PROCEDURE — 99283 EMERGENCY DEPT VISIT LOW MDM: CPT

## 2025-01-16 PROCEDURE — 99284 EMERGENCY DEPT VISIT MOD MDM: CPT | Performed by: EMERGENCY MEDICINE

## 2025-01-16 NOTE — ED PROVIDER NOTES
Time reflects when diagnosis was documented in both MDM as applicable and the Disposition within this note       Time User Action Codes Description Comment    1/16/2025  9:00 PM Jordi Shay Add [J02.9] Sore throat           ED Disposition       ED Disposition   Discharge    Condition   Stable    Date/Time   Thu Jan 16, 2025  9:00 PM    Comment   Adriana ZuñigaorrealPoli discharge to home/self care.                   Assessment & Plan       Medical Decision Making  29-year-old female presents with complaint of possible foreign body lodged in the throat.  States that she thinks that a chicken bone is stuck there  Patient denies any difficulty swallowing or difficulty breathing.  Will obtain a CT scan to assess for foreign body  CT revealed no foreign body  Patient was discharged in stable condition return precautions provided      Problems Addressed:  Sore throat: acute illness or injury    Amount and/or Complexity of Data Reviewed  Labs: ordered.  Radiology: ordered.             Medications - No data to display    ED Risk Strat Scores                                              History of Present Illness       Chief Complaint   Patient presents with    Sore Throat     Sore throat and swollen lymph nodes denies fever or chills. No med PTA       Past Medical History:   Diagnosis Date    Anxiety     Fibroid     Pancreatitis 2010      Past Surgical History:   Procedure Laterality Date    NO PAST SURGERIES        Family History   Problem Relation Age of Onset    Hypertension Mother     Diabetes Father     Cancer Neg Hx     Ovarian cancer Neg Hx     Colon cancer Neg Hx     Breast cancer Neg Hx       Social History     Tobacco Use    Smoking status: Never    Smokeless tobacco: Never   Vaping Use    Vaping status: Never Used   Substance Use Topics    Alcohol use: Never    Drug use: Never      E-Cigarette/Vaping    E-Cigarette Use Never User       E-Cigarette/Vaping Substances    Nicotine No     THC No     CBD No      Flavoring No     Other No       I have reviewed and agree with the history as documented.     HPI  29-year-old female presents with complaint of sore throat.  Patient states that she ate some chicken this afternoon and felt like she accidentally swallowed a chicken bone.  States that she feels like a chicken bone is lodged into her throat and it is causing discomfort.  Denies any other complaints    Review of Systems   Constitutional:  Negative for chills, diaphoresis, fever and unexpected weight change.   HENT:  Positive for sore throat. Negative for ear pain.    Eyes:  Negative for visual disturbance.   Respiratory:  Negative for cough, chest tightness and shortness of breath.    Cardiovascular:  Negative for chest pain and leg swelling.   Gastrointestinal:  Negative for abdominal distention, abdominal pain, constipation, diarrhea, nausea and vomiting.   Endocrine: Negative.    Genitourinary:  Negative for difficulty urinating and dysuria.   Musculoskeletal: Negative.    Skin: Negative.    Allergic/Immunologic: Negative.    Neurological: Negative.    Hematological: Negative.    Psychiatric/Behavioral: Negative.     All other systems reviewed and are negative.          Objective       ED Triage Vitals   Temperature Pulse Blood Pressure Respirations SpO2 Patient Position - Orthostatic VS   01/16/25 1830 01/16/25 1830 01/16/25 1830 01/16/25 1830 01/16/25 1830 01/16/25 1830   98.4 °F (36.9 °C) 74 138/72 18 100 % Sitting      Temp Source Heart Rate Source BP Location FiO2 (%) Pain Score    01/16/25 1830 01/16/25 1830 01/16/25 1830 -- 01/16/25 1900    Oral Monitor Right arm  2      Vitals      Date and Time Temp Pulse SpO2 Resp BP Pain Score FACES Pain Rating User   01/16/25 2102 98.4 °F (36.9 °C) 72 98 % 18 143/82 No Pain -- EZ   01/16/25 1900 -- 74 99 % 18 123/86 2 -- EZ   01/16/25 1830 98.4 °F (36.9 °C) 74 100 % 18 138/72 -- -- SN            Physical Exam  Vitals and nursing note reviewed.   Constitutional:        General: She is not in acute distress.     Appearance: Normal appearance. She is not ill-appearing.   HENT:      Head: Normocephalic and atraumatic.      Right Ear: External ear normal.      Left Ear: External ear normal.      Nose: Nose normal.      Mouth/Throat:      Mouth: Mucous membranes are moist.      Pharynx: Oropharynx is clear.   Eyes:      General: No scleral icterus.        Right eye: No discharge.         Left eye: No discharge.      Extraocular Movements: Extraocular movements intact.      Conjunctiva/sclera: Conjunctivae normal.      Pupils: Pupils are equal, round, and reactive to light.   Cardiovascular:      Rate and Rhythm: Normal rate and regular rhythm.      Pulses: Normal pulses.      Heart sounds: Normal heart sounds.   Pulmonary:      Effort: Pulmonary effort is normal.      Breath sounds: Normal breath sounds.   Abdominal:      General: Abdomen is flat. Bowel sounds are normal. There is no distension.      Palpations: Abdomen is soft.      Tenderness: There is no abdominal tenderness. There is no guarding or rebound.   Musculoskeletal:         General: Normal range of motion.      Cervical back: Normal range of motion and neck supple.   Skin:     General: Skin is warm and dry.      Capillary Refill: Capillary refill takes less than 2 seconds.   Neurological:      General: No focal deficit present.      Mental Status: She is alert and oriented to person, place, and time. Mental status is at baseline.   Psychiatric:         Mood and Affect: Mood normal.         Behavior: Behavior normal.         Thought Content: Thought content normal.         Judgment: Judgment normal.         Results Reviewed       Procedure Component Value Units Date/Time    POCT pregnancy, urine [[id number]]  (Normal) Collected: 01/16/25 1849    Lab Status: Final result Updated: 01/16/25 1849     EXT Preg Test, Ur Negative     Control Valid            CT soft tissue neck wo contrast   Final Interpretation by Rogelio Valles  MD (01/16 2056)      No radiopaque cervical foreign body identified.   No significant soft tissue inflammatory changes within limitation of unenhanced technique.         Workstation performed: [id number]             Procedures    ED Medication and Procedure Management   Prior to Admission Medications   Prescriptions Last Dose Informant Patient Reported? Taking?   medroxyPROGESTERone (DEPO-PROVERA) 150 mg/mL injection   No No   Sig: Inject 1 mL (150 mg total) into a muscle every 3 (three) months      Facility-Administered Medications: None     Discharge Medication List as of 1/16/2025  9:02 PM        CONTINUE these medications which have NOT CHANGED    Details   medroxyPROGESTERone (DEPO-PROVERA) 150 mg/mL injection Inject 1 mL (150 mg total) into a muscle every 3 (three) months, Starting Mon 1/13/2025, Normal           No discharge procedures on file.  ED SEPSIS DOCUMENTATION   Time reflects when diagnosis was documented in both MDM as applicable and the Disposition within this note       Time User Action Codes Description Comment    1/16/2025  9:00 PM Jordi Shay Add [J02.9] Sore throat                  Jordi Shay MD  01/16/25 5286

## 2025-01-17 NOTE — ED NOTES
Spoke to pt about following up with her PCP     come back if she can't swallow her spit  or if she has a hard time swallowing after drinking fluids.  If it gets hard for her to breathe   come back  verbalized understanding      Instructed on salt water gargles also     Nora Nelson RN  01/16/25 3612

## 2025-02-11 ENCOUNTER — OFFICE VISIT (OUTPATIENT)
Dept: FAMILY MEDICINE CLINIC | Facility: CLINIC | Age: 30
End: 2025-02-11

## 2025-02-11 VITALS
OXYGEN SATURATION: 99 % | TEMPERATURE: 97.6 F | SYSTOLIC BLOOD PRESSURE: 110 MMHG | DIASTOLIC BLOOD PRESSURE: 72 MMHG | BODY MASS INDEX: 34.25 KG/M2 | RESPIRATION RATE: 16 BRPM | WEIGHT: 226 LBS | HEIGHT: 68 IN | HEART RATE: 75 BPM

## 2025-02-11 DIAGNOSIS — M54.2 CERVICAL PAIN: Primary | ICD-10-CM

## 2025-02-11 PROCEDURE — 99213 OFFICE O/P EST LOW 20 MIN: CPT

## 2025-02-11 NOTE — PROGRESS NOTES
"Name: Adriana Meza      : 1995      MRN: [id number]  Encounter Provider: DANIELLE Vanegas  Encounter Date: 2025   Encounter department: [facility]  :  Assessment & Plan  Cervical pain    Orders:    Ambulatory Referral to Chiropractic; Future    Ambulatory Referral to Physical Therapy; Future        BMI Counseling: Body mass index is 34.36 kg/m². The BMI is above normal. Nutrition recommendations include decreasing portion sizes, encouraging healthy choices of fruits and vegetables, decreasing fast food intake, consuming healthier snacks, limiting drinks that contain sugar, moderation in carbohydrate intake, increasing intake of lean protein, reducing intake of saturated and trans fat and reducing intake of cholesterol. Exercise recommendations include moderate physical activity 150 minutes/week. No pharmacotherapy was ordered. Rationale for BMI follow-up plan is due to patient being overweight or obese.       History of Present Illness   Adriana Meza is a 29 y.o. female  has a past medical history of Anxiety, Fibroid, and Pancreatitis.  has a past surgical history that includes No past surgeries.    Here today for cervical pain x 2-3 weeks. Pain is describing as aching & is a 5/10. She works at a [employer]. This is a new problem. She has not tried medication for this except for tylenol which was effective.       Review of Systems   HENT:  Negative for ear pain and sore throat.    Respiratory:  Negative for cough and shortness of breath.    All other systems reviewed and are negative.      Objective   /72 (BP Location: Left arm, Patient Position: Sitting, Cuff Size: Large)   Pulse 75   Temp 97.6 °F (36.4 °C) (Temporal)   Resp 16   Ht 5' 8\" (1.727 m)   Wt 103 kg (226 lb)   LMP  (LMP Unknown)   SpO2 99%   BMI 34.36 kg/m²      Physical Exam  Vitals and nursing note reviewed.   Constitutional:       " General: She is not in acute distress.     Appearance: Normal appearance. She is well-developed. She is obese.   HENT:      Head: Normocephalic and atraumatic.      Right Ear: External ear normal.      Left Ear: External ear normal.      Nose: Nose normal.   Eyes:      Conjunctiva/sclera: Conjunctivae normal.   Cardiovascular:      Rate and Rhythm: Regular rhythm.   Pulmonary:      Effort: Pulmonary effort is normal.   Abdominal:      Tenderness: There is no abdominal tenderness.   Musculoskeletal:         General: Normal range of motion.      Cervical back: Full passive range of motion without pain, normal range of motion and neck supple.   Skin:     General: Skin is warm and dry.   Neurological:      General: No focal deficit present.      Mental Status: She is alert and oriented to person, place, and time. Mental status is at baseline.   Psychiatric:         Mood and Affect: Mood normal.         Behavior: Behavior normal.         Thought Content: Thought content normal.         Judgment: Judgment normal.

## 2025-03-24 NOTE — PROGRESS NOTES
Assessment & Plan  Class 1 obesity    Current weight: 227    Spoke about medication options but would recommend follow-up behavioral specialist for emotional eating, anxiety and to help enforce healthy habits in relation to food.     She is also on Depo which also can contribute to water retention    Patient understands the importance of making lifestyle changes as recommended below to aid in weight loss.      Dietary Recommendations:  Recommend to avoid skipping any meals. Adequate amount of Macronutrients (minimal 3 meals a day) is necessary to help improve metabolism, satiety and allow for better portion control by decreasing Ghrelin (hunger hormone). Lack of hunger can be suppressed by a hormone called Leptin (full hormone) which can occur from a previous meal or caffeine intake    Protein intake throughout the day can help promote satiety and is necessary for muscle growth/repair    Carbohydrates are essential as it is the vital source of fuel for daily activities. energy, cell function, nutrient absorption, and hormone production.     Fats: Essential vitamins like A, D, and E, support cell growth, function and are necessary for nutrient absorption to support your organs     Fluid intake which is at least half your body weight in ounces is necessary to help control cravings (decreasing confusion for appetite vs water deprivation) as the human body is made up of 50-70% of Fluids. If there is a diversion for water alone, would recommend flavored water (example-splash of lemonade or ice tea) to help promote compliance. Fluids include Teas, water, flavored water, seltzer water, coffee, shakes    Metabolism:    Metabolism can also be promoted by macronutrient intake and increased muscle weight via thermogenesis      Daily Calorie Needs: Recommend to take into account any fluid losses and calories burned via increased activity levels as daily calories may need to be adjusted     Weight check: Weights can  "fluctuate depending on fluid shifts vs what foods are consumed prior to checking your weight    Recent notes, labs and records were reviewed. Total time with chart review and with the patient: 45 min            Return for Behavorial health specilist for stress eating .   ______________________________________________________________________        Subjective:     Chief Complaint   Patient presents with    Consult     Mwm Consult: waist: 40.5in sb: 0/8        HPI: 29 y.o. female with pmh presents to the weight management clinic for consultation.     Wt Loss History:   Difficult with weight loss since postpartum     Dietary Regimen:  Breakfast- eggs, bread  Lunch: Rice, meat   Dinner: Skip    Cravings:Sweets which she reports is related to stress eating and anxiety    Fluids: 4-5 bottles of water              Social hx:  Occupation: [employer] (5 days, 10 hours)     Review Of Systems:  General: No pallor, no weakness   Pulmonary: Negative for shortness of breath  Chest: negative for chest pain  Gastrointestinal:  Negative for abdominal pain, diarrhea   Psychiatric/Behavioral:  Negative for behavioral problems, confusion, dysphoric mood and hallucinations.    All other systems reviewed and are negative.     Objective:  /76   Pulse 82   Temp 97.5 °F (36.4 °C)   Resp 16   Ht 5' 8\" (1.727 m)   Wt 103 kg (227 lb 12.8 oz)   BMI 34.64 kg/m²     Wt Readings from Last 30 Encounters:   03/25/25 103 kg (227 lb 12.8 oz)   02/11/25 103 kg (226 lb)   01/16/25 101 kg (223 lb 5.2 oz)   01/15/25 100 kg (220 lb 9.6 oz)   01/08/25 100 kg (220 lb 6.4 oz)   11/27/24 98.9 kg (218 lb 1.6 oz)   11/18/24 97.5 kg (215 lb)   11/07/24 98 kg (216 lb)   10/14/24 96.5 kg (212 lb 12.8 oz)   07/24/24 98.3 kg (216 lb 12.8 oz)   05/08/24 100 kg (221 lb)   02/29/24 103 kg (226 lb)   02/08/24 116 kg (256 lb)   02/06/24 116 kg (256 lb)   01/22/24 114 kg (252 lb)   01/16/24 114 kg (250 lb 12.8 oz)   01/10/24 113 kg (248 lb 9.6 oz)   12/26/23 111 " kg (244 lb 6.4 oz)   12/15/23 107 kg (235 lb 12.8 oz)   12/14/23 108 kg (238 lb 9.6 oz)   12/12/23 108 kg (238 lb 6.1 oz)   12/12/23 108 kg (238 lb 6.4 oz)   12/08/23 108 kg (238 lb 1.6 oz)   11/21/23 106 kg (232 lb 12.8 oz)   11/17/23 105 kg (232 lb 6.4 oz)   11/02/23 104 kg (230 lb 3.2 oz)   11/01/23 103 kg (226 lb 6.4 oz)   10/18/23 99.1 kg (218 lb 6.4 oz)   10/04/23 97.2 kg (214 lb 3.2 oz)   09/06/23 92.6 kg (204 lb 3.2 oz)       Physical Exam  Constitutional:       General: No acute distress.  Well-nourished  HENT:      Head: Normocephalic and atraumatic.   Eyes:      Extraocular Movements: Extraocular movements intact.      Conjunctiva/pupils: Conjunctivae normal. Pupils are equal, round  Pulmonary:      Effort: Pulmonary effort is normal. No labored breathing   Neurological:      General: No focal deficit present.  AO x 3     Mental Status: Alert and oriented to person, place, and time. Mental status is at baseline.   Psychiatric:         Mood and Affect: Mood normal.         Behavior: Behavior normal.     Labs and Imaging  Recent labs and imaging have been personally reviewed.

## 2025-03-25 ENCOUNTER — OFFICE VISIT (OUTPATIENT)
Dept: BARIATRICS | Facility: CLINIC | Age: 30
End: 2025-03-25

## 2025-03-25 VITALS
HEART RATE: 82 BPM | RESPIRATION RATE: 16 BRPM | HEIGHT: 68 IN | TEMPERATURE: 97.5 F | SYSTOLIC BLOOD PRESSURE: 124 MMHG | DIASTOLIC BLOOD PRESSURE: 76 MMHG | WEIGHT: 227.8 LBS | BODY MASS INDEX: 34.53 KG/M2

## 2025-03-25 DIAGNOSIS — E66.9 OBESITY (BMI 30-39.9): ICD-10-CM

## 2025-03-25 DIAGNOSIS — E66.09 CLASS 1 OBESITY DUE TO EXCESS CALORIES WITH BODY MASS INDEX (BMI) OF 34.0 TO 34.9 IN ADULT: Primary | ICD-10-CM

## 2025-03-25 DIAGNOSIS — E66.811 CLASS 1 OBESITY DUE TO EXCESS CALORIES WITH BODY MASS INDEX (BMI) OF 34.0 TO 34.9 IN ADULT: Primary | ICD-10-CM

## 2025-03-25 PROCEDURE — 99244 OFF/OP CNSLTJ NEW/EST MOD 40: CPT | Performed by: STUDENT IN AN ORGANIZED HEALTH CARE EDUCATION/TRAINING PROGRAM

## 2025-04-03 ENCOUNTER — OFFICE VISIT (OUTPATIENT)
Dept: OBGYN CLINIC | Facility: CLINIC | Age: 30
End: 2025-04-03

## 2025-04-03 VITALS — WEIGHT: 226 LBS | BODY MASS INDEX: 34.36 KG/M2 | DIASTOLIC BLOOD PRESSURE: 80 MMHG | SYSTOLIC BLOOD PRESSURE: 130 MMHG

## 2025-04-03 DIAGNOSIS — Z30.09 UNWANTED FERTILITY: Primary | ICD-10-CM

## 2025-04-03 DIAGNOSIS — N93.9 ABNORMAL UTERINE BLEEDING (AUB): ICD-10-CM

## 2025-04-03 PROCEDURE — 99213 OFFICE O/P EST LOW 20 MIN: CPT | Performed by: NURSE PRACTITIONER

## 2025-04-03 NOTE — PROGRESS NOTES
"PROBLEM GYNECOLOGICAL VISIT    Adriana Meza is a 29 y.o. female who presents today with complaint of unwanted fertility.  Her general medical history has been reviewed and she reports it as follows:    Past Medical History:   Diagnosis Date    Anxiety     Fibroid     Pancreatitis      Past Surgical History:   Procedure Laterality Date    NO PAST SURGERIES       OB History          2    Para   1    Term   1       0    AB   1    Living   1         SAB   1    IAB   0    Ectopic   0    Multiple   0    Live Births   1               Social History     Tobacco Use    Smoking status: Never    Smokeless tobacco: Never   Vaping Use    Vaping status: Never Used   Substance Use Topics    Alcohol use: Never    Drug use: Never     Social History     Substance and Sexual Activity   Sexual Activity Yes    Partners: Male    Birth control/protection: Injection       Current Outpatient Medications   Medication Instructions    medroxyPROGESTERone (DEPO-PROVERA) 150 mg, Intramuscular, Every 3 months       History of Present Illness:   Reports that she is unhappy with Depoprovera contraception (which she has been using for just over a year) due to AUB and weight gain.  She desires to discontinue this contraceptive method and only use condoms for a period of time (she cannot specify how long) and then re-evaluate what method she would like to use.  States she wants her body to \"reset\".    Review of Systems:  Review of Systems   Constitutional:  Positive for unexpected weight change.   Genitourinary:  Positive for vaginal bleeding (AUB).       Physical Exam:  /80 (BP Location: Right arm, Patient Position: Sitting, Cuff Size: Standard)   Wt 103 kg (226 lb)   LMP 2025 (Approximate)   BMI 34.36 kg/m²   Physical Exam  Constitutional:       General: She is not in acute distress.  Neurological:      Mental Status: She is alert.   Skin:     General: Skin is warm and dry.   Vitals reviewed. "       Assessment:   1. Unwanted fertility.   2. AUB.    Plan:   1. Discussed alternative contraceptive options and she will consider Paragard IUD.  Provided with literature.  Encouraged her to use condoms if she desires to avoid prgnancy.   2. Reviewed that it may take up to a year for normal menses pattern to return after discontinuing Depoprovera.   3. Return to office as previously scheduled for 3rd HPV vaccine and annual GYN exam.   4. Patient's depression screening was assessed with a PHQ-2 score of 0. Clinically patient does not have depression. No treatment is required.

## 2025-04-25 NOTE — TELEPHONE ENCOUNTER
----- Message from Nancy Livingston MD sent at 12/19/2023  8:30 AM EST -----  Please let Adriana know that her glucose test was normal. Thank you    no (get order)

## 2025-05-21 ENCOUNTER — CLINICAL SUPPORT (OUTPATIENT)
Dept: OBGYN CLINIC | Facility: CLINIC | Age: 30
End: 2025-05-21

## 2025-05-21 VITALS — SYSTOLIC BLOOD PRESSURE: 118 MMHG | BODY MASS INDEX: 35.12 KG/M2 | DIASTOLIC BLOOD PRESSURE: 69 MMHG | WEIGHT: 231 LBS

## 2025-05-21 DIAGNOSIS — Z23 NEED FOR HPV VACCINATION: Primary | ICD-10-CM

## 2025-05-21 NOTE — PROGRESS NOTES
Pt here for 3rd Hpv vaccine given in the left arm       NDC#[id number]  LOT#[id number]  EXP#1/28/2027